# Patient Record
Sex: FEMALE | Race: WHITE
[De-identification: names, ages, dates, MRNs, and addresses within clinical notes are randomized per-mention and may not be internally consistent; named-entity substitution may affect disease eponyms.]

---

## 2018-01-04 ENCOUNTER — HOSPITAL ENCOUNTER (INPATIENT)
Dept: HOSPITAL 92 - ONC | Age: 23
LOS: 15 days | Discharge: HOME | DRG: 386 | End: 2018-01-19
Attending: INTERNAL MEDICINE | Admitting: INTERNAL MEDICINE
Payer: COMMERCIAL

## 2018-01-04 VITALS — BODY MASS INDEX: 25 KG/M2

## 2018-01-04 DIAGNOSIS — K90.9: ICD-10-CM

## 2018-01-04 DIAGNOSIS — K51.511: ICD-10-CM

## 2018-01-04 DIAGNOSIS — K12.1: ICD-10-CM

## 2018-01-04 DIAGNOSIS — F41.9: ICD-10-CM

## 2018-01-04 DIAGNOSIS — K50.111: Primary | ICD-10-CM

## 2018-01-04 DIAGNOSIS — J45.909: ICD-10-CM

## 2018-01-04 DIAGNOSIS — Z83.2: ICD-10-CM

## 2018-01-04 DIAGNOSIS — Z90.49: ICD-10-CM

## 2018-01-04 DIAGNOSIS — D63.8: ICD-10-CM

## 2018-01-04 DIAGNOSIS — D69.6: ICD-10-CM

## 2018-01-04 DIAGNOSIS — D50.0: ICD-10-CM

## 2018-01-04 LAB
ALBUMIN SERPL BCG-MCNC: 4.3 G/DL (ref 3.5–5)
ALP SERPL-CCNC: 77 U/L (ref 40–150)
ALT SERPL W P-5'-P-CCNC: 11 U/L (ref 8–55)
ANION GAP SERPL CALC-SCNC: 13 MMOL/L (ref 10–20)
AST SERPL-CCNC: 14 U/L (ref 5–34)
BACTERIA UR QL AUTO: (no result) HPF
BASOPHILS # BLD AUTO: 0.1 THOU/UL (ref 0–0.2)
BASOPHILS NFR BLD AUTO: 0.7 % (ref 0–1)
BILIRUB SERPL-MCNC: 0.5 MG/DL (ref 0.2–1.2)
BUN SERPL-MCNC: 7 MG/DL (ref 7–18.7)
CALCIUM SERPL-MCNC: 9.9 MG/DL (ref 7.8–10.44)
CHLORIDE SERPL-SCNC: 104 MMOL/L (ref 98–107)
CO2 SERPL-SCNC: 25 MMOL/L (ref 22–29)
CREAT CL PREDICTED SERPL C-G-VRATE: 115 ML/MIN (ref 70–130)
CRP SERPL-MCNC: 2.51 MG/DL
CRYSTAL-AUWI FLAG: 0.1 (ref 0–15)
EOSINOPHIL # BLD AUTO: 0.3 THOU/UL (ref 0–0.7)
EOSINOPHIL NFR BLD AUTO: 3.3 % (ref 0–10)
GLOBULIN SER CALC-MCNC: 4.5 G/DL (ref 2.4–3.5)
GLUCOSE SERPL-MCNC: 95 MG/DL (ref 70–105)
HEV IGM SER QL: 1.4 (ref 0–7.99)
HGB BLD-MCNC: 13.4 G/DL (ref 12–16)
HYALINE CASTS #/AREA URNS LPF: (no result) LPF
LYMPHOCYTES # BLD: 2.4 THOU/UL (ref 1.2–3.4)
LYMPHOCYTES NFR BLD AUTO: 23.1 % (ref 21–51)
MCH RBC QN AUTO: 31.9 PG (ref 27–31)
MCV RBC AUTO: 95.7 FL (ref 81–99)
MONOCYTES # BLD AUTO: 1.3 THOU/UL (ref 0.11–0.59)
MONOCYTES NFR BLD AUTO: 12.7 % (ref 0–10)
NEUTROPHILS # BLD AUTO: 6.2 THOU/UL (ref 1.4–6.5)
NEUTROPHILS NFR BLD AUTO: 60.1 % (ref 42–75)
PATHC CAST-AUWI FLAG: 0.13 (ref 0–2.49)
PLATELET # BLD AUTO: 591 THOU/UL (ref 130–400)
POTASSIUM SERPL-SCNC: 4 MMOL/L (ref 3.5–5.1)
RBC # BLD AUTO: 4.19 MILL/UL (ref 4.2–5.4)
RBC UR QL AUTO: (no result) HPF (ref 0–3)
SODIUM SERPL-SCNC: 138 MMOL/L (ref 136–145)
SP GR UR STRIP: 1.02 (ref 1–1.04)
SPERM-AUWI FLAG: 0 (ref 0–9.9)
WBC # BLD AUTO: 10.4 THOU/UL (ref 4.8–10.8)
WBC UR QL AUTO: (no result) HPF (ref 0–3)
YEAST-AUWI FLAG: 0 (ref 0–25)

## 2018-01-04 PROCEDURE — 36415 COLL VENOUS BLD VENIPUNCTURE: CPT

## 2018-01-04 PROCEDURE — 87046 STOOL CULTR AEROBIC BACT EA: CPT

## 2018-01-04 PROCEDURE — 80048 BASIC METABOLIC PNL TOTAL CA: CPT

## 2018-01-04 PROCEDURE — 87899 AGENT NOS ASSAY W/OPTIC: CPT

## 2018-01-04 PROCEDURE — 83735 ASSAY OF MAGNESIUM: CPT

## 2018-01-04 PROCEDURE — 85027 COMPLETE CBC AUTOMATED: CPT

## 2018-01-04 PROCEDURE — 87329 GIARDIA AG IA: CPT

## 2018-01-04 PROCEDURE — 85007 BL SMEAR W/DIFF WBC COUNT: CPT

## 2018-01-04 PROCEDURE — 87804 INFLUENZA ASSAY W/OPTIC: CPT

## 2018-01-04 PROCEDURE — 87324 CLOSTRIDIUM AG IA: CPT

## 2018-01-04 PROCEDURE — 88305 TISSUE EXAM BY PATHOLOGIST: CPT

## 2018-01-04 PROCEDURE — 85652 RBC SED RATE AUTOMATED: CPT

## 2018-01-04 PROCEDURE — 86140 C-REACTIVE PROTEIN: CPT

## 2018-01-04 PROCEDURE — 87449 NOS EACH ORGANISM AG IA: CPT

## 2018-01-04 PROCEDURE — 84100 ASSAY OF PHOSPHORUS: CPT

## 2018-01-04 PROCEDURE — 81001 URINALYSIS AUTO W/SCOPE: CPT

## 2018-01-04 PROCEDURE — 85025 COMPLETE CBC W/AUTO DIFF WBC: CPT

## 2018-01-04 PROCEDURE — 86480 TB TEST CELL IMMUN MEASURE: CPT

## 2018-01-04 PROCEDURE — 87045 FECES CULTURE AEROBIC BACT: CPT

## 2018-01-04 PROCEDURE — A4216 STERILE WATER/SALINE, 10 ML: HCPCS

## 2018-01-04 PROCEDURE — 87328 CRYPTOSPORIDIUM AG IA: CPT

## 2018-01-04 PROCEDURE — 80053 COMPREHEN METABOLIC PANEL: CPT

## 2018-01-04 NOTE — HP
PRIMARY CARE PHYSICIAN:  Dr. Isaiah Woods at [facility].

 

CHIEF COMPLAINT:  Bloody diarrhea.

 

HISTORY OF PRESENT ILLNESS:  Ms. Abernathy is a pleasant 22-year-old lady who was seen at [facility] on 01/04/2018, after she was sent to the hospital for direct admission by her gas
troenterologist.

 

She reports that she has been having diffuse abdominal discomfort as well as bloody diarrhea over sev
eral days.  She reports having multiple bloody bowel movements today.  She also reports feeling light
headed once today.  She denies any chest pain or shortness of breath.  She denies any fevers or chill
s.  She has not eaten most of today.

 

She reports that she was on Remicade for the last 4 years.  She was sent to the hospital for further 
management while awaiting approval for Humira.

 

REVIEW OF SYSTEMS:  The following complete review of systems was negative, unless otherwise mentioned
 in the HPI or below:

Constitutional:  Weight loss or gain, ability to conduct usual activities.

Skin:  Rash, itching.

Eyes:  Double vision, pain.

ENT/Mouth:  Nose bleeding, neck stiffness, pain, tenderness.

Cardiovascular:  Palpitations, dyspnea on exertion, orthopnea.

Respiratory:  Shortness of breath, wheezing, cough, hemoptysis, fever, or night sweats.

Gastrointestinal:  Poor appetite, abdominal pain, heartburn, nausea, vomiting, constipation, or diarr
hea.

Genitourinary:  Urgency, frequency, dysuria, nocturia.

Musculoskeletal:  Pain, swelling.

Neurologic/Psychiatric:  Anxiety, depression.

Allergy/Immunologic:  Skin rash, bleeding tendency.

 

PAST MEDICAL HISTORY:  Significant for ulcerative colitis and Crohn's disease.

 

PAST SURGICAL HISTORY:  Significant for right hemicolectomy.

 

ALLERGIES:  CODEINE.

 

CURRENT MEDICATIONS:  Six-mercaptopurine 50 mg daily.

 

SOCIAL HISTORY:  Patient denies tobacco use, alcohol use, or recreational drug use.

 

FAMILY HISTORY:  Significant for Crohn's disease and ulcerative colitis in her mother and grandfather
.

 

PHYSICAL EXAMINATION:

GENERAL:  Ms. Abernathy is awake and alert, not in acute distress.

VITAL SIGNS:  She is afebrile.  Blood pressure is 111/70, pulse is 86.  She is breathing at rate of 1
4 and saturating 100% on room air.

EYES:  No scleral icterus, no conjunctival pallor.

ENT:  Moist mucosal membranes, no oropharyngeal erythema or exudates.

NECK:  Supple, nontender, normal range of movement, trachea is midline.

RESPIRATORY:  Accessory muscles of breathing are not active.  Chest wall movements are symmetric bila
terally.

LUNGS:  Clear to auscultation without wheeze, rhonchi, or crepitations.

CARDIOVASCULAR:  S1 and S2 are heard, regular.  Peripheral pulses are palpable.  No carotid bruit, no
 pericardial rub.

ABDOMEN:  Soft, nontender, bowel sounds heard, no hepatomegaly, no splenomegaly.

EXTREMITIES:  She has a scar over the abdomen.

NEUROLOGIC:  Cranial nerves II-XII are intact, deep tendon reflexes are 2+.

MUSCULOSKELETAL:  Power is 5/5 in all 4 extremities.  Normal range of movement at all major extremity
 joints.

LYMPHATIC:  No cervical lymphadenopathy.

SKIN:  No rashes or subcutaneous nodules.

PSYCHIATRIC:  Normal mood, normal affect, patient is oriented to person, place, and time.  

 

LABORATORY DATA:  Ms. Richard labs and investigations were reviewed.  She has a normal white count, 
normal hemoglobin, elevated platelet count of 591,000, last known platelet count was 435,000 on 12/18
/2017, normal  electrolytes, normal creatinine, elevated C-reactive protein of 2.51, normal albumin a
nd urinalysis that is positive for ketones and small amount of leukocyte esterase.

 

ASSESSMENT AND PLAN:  Ms. Cancino is a pleasant 22-year-old lady who was seen at [facility] on 01/04/2018.  Her problem list includes:

1.  Bloody diarrhea:  Due to flareup of Crohn's and ulcerative colitis.

2.  Ulcerative colitis/Crohn's disease:  Patient has been started on intravenous steroids by Gastroen
terology Service.  She does not wish to take tonight's dose because of the possibility that it will k
eep her up during the night.  She will start steroids in the morning.  She is also awaiting approval 
for Humira.

3.  Thrombocythemia:  Chronic, likely reactive.  We will recheck platelet count.

 

Many thanks for allowing me to participate in your patient's care.  Please feel free to contact me wi
th any questions or concerns.

 

LEVEL OF RISK:  Moderate.

 

LEVEL OF COMPLEXITY:  Moderate.

## 2018-01-05 LAB
ANION GAP SERPL CALC-SCNC: 12 MMOL/L (ref 10–20)
BASOPHILS # BLD AUTO: 0 THOU/UL (ref 0–0.2)
BASOPHILS NFR BLD AUTO: 0.4 % (ref 0–1)
BUN SERPL-MCNC: 4 MG/DL (ref 7–18.7)
CALCIUM SERPL-MCNC: 8.6 MG/DL (ref 7.8–10.44)
CHLORIDE SERPL-SCNC: 108 MMOL/L (ref 98–107)
CO2 SERPL-SCNC: 21 MMOL/L (ref 22–29)
CREAT CL PREDICTED SERPL C-G-VRATE: 126 ML/MIN (ref 70–130)
EOSINOPHIL # BLD AUTO: 0.4 THOU/UL (ref 0–0.7)
EOSINOPHIL NFR BLD AUTO: 3.3 % (ref 0–10)
GLUCOSE SERPL-MCNC: 97 MG/DL (ref 70–105)
HGB BLD-MCNC: 11.2 G/DL (ref 12–16)
LYMPHOCYTES # BLD: 2.2 THOU/UL (ref 1.2–3.4)
LYMPHOCYTES NFR BLD AUTO: 20.1 % (ref 21–51)
MCH RBC QN AUTO: 32.2 PG (ref 27–31)
MCV RBC AUTO: 95.7 FL (ref 81–99)
MONOCYTES # BLD AUTO: 1.4 THOU/UL (ref 0.11–0.59)
MONOCYTES NFR BLD AUTO: 12.6 % (ref 0–10)
NEUTROPHILS # BLD AUTO: 7 THOU/UL (ref 1.4–6.5)
NEUTROPHILS NFR BLD AUTO: 63.6 % (ref 42–75)
PLATELET # BLD AUTO: 465 THOU/UL (ref 130–400)
POTASSIUM SERPL-SCNC: 3.8 MMOL/L (ref 3.5–5.1)
RBC # BLD AUTO: 3.47 MILL/UL (ref 4.2–5.4)
SODIUM SERPL-SCNC: 137 MMOL/L (ref 136–145)
WBC # BLD AUTO: 11 THOU/UL (ref 4.8–10.8)

## 2018-01-05 RX ADMIN — ALUMINUM ZIRCONIUM TRICHLOROHYDREX GLY SCH EACH: 0.2 STICK TOPICAL at 21:18

## 2018-01-05 NOTE — PRG
DATE OF SERVICE:  01/05/2018

 

SUBJECTIVE:  The patient is feeling better today.  She reports the ulcers in her mouth have improved.
  She has not had many bowel movements today, but she reports she is not eating as well.  She says law carcamo is scared to eat because of the abdominal discomfort that this will cause.

 

OBJECTIVE:

VITAL SIGNS:  Temperature 98.0, pulse of 70, respiratory rate 14, blood pressure 106/63.

CHEST:  Clear.

CARDIOVASCULAR:  Regular rate and rhythm.

ABDOMEN:  Benign.

 

LABORATORY DATA:  Shows a white blood cell count of 11.0, hemoglobin 11.2, hematocrit 33.2.  Chemistr
ies shows CO2 of 21.

 

ASSESSMENT:

1.  Indeterminate colitis flare.

2.  History of right colon resection.

 

RECOMMENDATIONS:

1.  Flexible sigmoidoscopy in a.m.

2.  Continue 6-MP.

3.  Continue IV steroids.

4.  Begin Humira when available.

## 2018-01-05 NOTE — PDOC.PN
- Subjective


Encounter Start Date: 01/05/18


Encounter Start Time: 07:00


-: old records requested/rev





pt has hematochesia and diarrhoea, no fever


Patient seen and examined. No overnight events





- Objective


MAR Reviewed: Yes


Vital Signs & Weight: 


 Vital Signs (12 hours)











  Temp Pulse Resp BP Pulse Ox


 


 01/05/18 08:50  99.4 F  104 H  18   99


 


 01/05/18 06:55  99.4 F  104 H  18  111/69  99








 Weight











Weight                         128 lb 8 oz














I&O: 


 











 01/04/18 01/05/18 01/06/18





 06:59 06:59 06:59


 


Intake Total   1900


 


Balance   1900











Result Diagrams: 


 01/05/18 04:10





 01/05/18 04:10





Phys Exam





- Physical Examination


Constitutional: NAD


HEENT: PERRLA, moist MMs, sclera anicteric


Neck: no JVD, supple


Respiratory: no wheezing, no rales, no rhonchi


Cardiovascular: RRR, no significant murmur, no rub


Gastrointestinal: soft, non-tender, no distention, positive bowel sounds


Musculoskeletal: no edema, pulses present


Neurological: non-focal, normal sensation


Lymphatic: no nodes


Psychiatric: normal affect, A&O x 3


Skin: no rash, normal turgor





Dx/Plan


(1) Diarrhea


Code(s): R19.7 - DIARRHEA, UNSPECIFIED   Status: Acute   





(2) Exacerbation of ulcerative colitis


Code(s): K51.90 - ULCERATIVE COLITIS, UNSPECIFIED, WITHOUT COMPLICATIONS   

Status: Acute   





(3) Hematochezia


Code(s): K92.1 - MELENA   Status: Acute   





(4) Anemia in chronic illness


Code(s): D63.8 - ANEMIA IN OTHER CHRONIC DISEASES CLASSIFIED ELSEWHERE   Status

: Chronic   





- Plan


cont current plan of care





* continue ivf


* continue solumedrol


* GI following


* medication reviewed as below


* symptomatic treatment.








Review of Systems





- Review of Systems


Constitutional: negative: fever, chills, sweats, weakness, malaise, other


Eyes: negative: Pain, Vision Change, Conjunctivae Inflammation, Eyelid 

Inflammation, Redness, Other


ENT: negative: Ear Pain, Ear Discharge, Nose Pain, Nose Discharge, Nose 

Congestion, Mouth Pain, Mouth Swelling, Throat Pain, Throat Swelling, Other


Respiratory: negative: Cough, Dry, Shortness of Breath, Hemoptysis, SOB with 

Excertion, Pleuritic Pain, Sputum, Wheezing


Cardiovascular: negative: chest pain, palpitations, orthopnea, paroxysmal 

nocturnal dyspnea, edema, light headedness, other


Gastrointestinal: Diarrhea, Hematochezia.  negative: Nausea, Vomiting, 

Abdominal Pain, Constipation, Melena, Other


Genitourinary: negative: Dysuria, Frequency, Incontinence, Hematuria, Retention

, Other


Musculoskeletal: negative: Neck Pain, Shoulder Pain, Arm Pain, Back Pain, Hand 

Pain, Leg Pain, Foot Pain, Other


Skin: negative: Rash, Lesions, Robert, Bruising, Other


Neurological: negative: Weakness, Numbness, Incoordination, Change in Speech, 

Confusion, Seizures, Other





- Medications/Allergies


Allergies/Adverse Reactions: 


 Allergies











Allergy/AdvReac Type Severity Reaction Status Date / Time


 


codeine Allergy   Verified 01/04/18 22:33











Medications: 


 Current Medications





Acetaminophen (Tylenol)  650 mg PO Q4H PRN


   PRN Reason: Headache/Fever or Pain


Sodium Chloride (Normal Saline 0.9%)  1,000 mls @ 125 mls/hr IV .Q8H [facility]


   Last Admin: 01/05/18 02:30 Dose:  1,000 mls


Mercaptopurine (Purinethol)  50 mg PO DAILY [facility]


   Last Admin: 01/05/18 08:26 Dose:  Not Given


Methylprednisolone Sodium Succinate (Solu-Medrol)  20 mg IVP 0600,1200,1800 [facility]


   Last Admin: 01/05/18 06:08 Dose:  20 mg


Tramadol HCl (Ultram)  50 mg PO Q8H PRN


   PRN Reason: Moderate Pain (4-6)


   Last Admin: 01/05/18 06:12 Dose:  50 mg


Zolpidem Tartrate (Ambien)  5 mg PO HSPRN PRN


   PRN Reason: Insomnia

## 2018-01-05 NOTE — CON
DATE OF CONSULTATION:  01/04/2018

 

HISTORY OF PRESENT ILLNESS:  The patient is a 22-year-old  female, patient of Dr. Ramsey haley
h a long history of Crohn's disease.  She reports over the last several weeks, she has had progressiv
ely worsening diarrhea.  She reports the diarrhea is mostly blood with little bit of mucus and very l
ittle stool.  She has 1-2 tablespoons every 20-30 minutes, particularly worsened if she is up and wal
luis e around.  She reports diffuse abdominal discomfort, but not significant pain.  She reports she ha
s had no vomiting, but she does feel nausea, she has not had much p.o. intake.  She has lost 10 pound
s over the last several weeks.  She has been on Remicade and 6-MP, but this has recently been stopped
 and she is due to start Humira this Monday.  She does not like oral steroids because they interfere 
with her sleep.

 

PAST MEDICAL HISTORY:  Anxiety, asthma, indeterminate colitis.

 

PAST SURGICAL HISTORY:  Includes right hemicolectomy secondary to perforation.

 

ALLERGIES:  CODEINE.

 

MEDICATIONS:  At this present time include "enema" 100 mg per 60 mL 1 enema every night for 14 days, 
Remicade 600 mg at 10 mg/kg every 8 weeks, 6-mercaptopurine 50 mg 1 p.o. daily, Restasis eyedrops.

 

SOCIAL HISTORY:  She drinks rarely.  Does not smoke.

 

FAMILY HISTORY:  Significant for autoimmune disease.

 

REVIEW OF SYSTEMS:  Ten systems were reviewed and negative for above.

 

PHYSICAL EXAMINATION:

VITAL SIGNS:  Shows temperature 99.5, pulse 82, respiratory rate 18, blood pressure 105/79.

HEENT:  Unremarkable.

NECK:  Supple.

CHEST:  Clear.

CARDIOVASCULAR:  Regular rate and rhythm.

ABDOMEN:  Soft, nontender, without organomegaly or masses.

RECTAL:  Deferred.

EXTREMITIES:  Normal.

NEUROLOGIC:  Nonfocal.

 

LABORATORY DATA:  Shows normal white count of 10.4, hemoglobin 13.4, hematocrit of 40.2, platelet cou
nt 591.  Chemistries significant for C-reactive protein 2.51.  Total protein 88, globulin 45.  Last p
rocedure was performed by the Dr. Mustafa on 02/09/2017 showed ileal to be normal and anastomosis was 
normal.  Descending and transverse colon were normal.  Some scarred sigmoid colon was normal other th
an scarring and eroded vascular plaque was in the rectum, indicating very mild proctitis.  Prior to t
hat in 09/15/2017, colonoscopy showed fairly normal appearing colon.  Laboratory showed CMV DNA Ultra
quant was negative.  Also on December chemistries were normal.  Lipase was 16.  CRP was 0.87.  Sed ra
te was 10.  CBC was essentially normal.  Histoplasma antigen was less than 0.5.  Stool was presence o
f elevated fecal lactoferrin.  C. diff was negative.  Prior biopsies from February showed chronic mod
erate active colitis and rectal biopsies and chronic mild active colitis and sigmoid biopsies.  Prome
hari labs showed detectable serum infliximab and detectable antibodies to infliximab.

 

ASSESSMENT:

1.  A 22-year-old female with history of indeterminate colitis, now with worsening abdominal discomfo
rt and bloody diarrhea.  The diarrhea sounds like if it is almost anal outlet bleeding or possibly a 
proctitis related bleeding.  She did not have a large amount of associated diarrhea.  Therefore, I do
 not think that she has a colitis affecting more proximal portions of the colon.

2.  Right colon resection secondary to perforation.

 

RECOMMENDATIONS:

1.  We will continue 6-MP, we will start some oral steroids after tonight.

2.  Rehydrate.

3.  I think we will probably need to do a colonoscopy on this patient to further elucidate the extent
 of her active disease.

4.  Agree with switching from Remicade to Humira, we will see if we were able to obtain Humira in the
 hospital or bringing in her Humira for loading.

## 2018-01-06 PROCEDURE — 0DBG8ZX EXCISION OF LEFT LARGE INTESTINE, VIA NATURAL OR ARTIFICIAL OPENING ENDOSCOPIC, DIAGNOSTIC: ICD-10-PCS

## 2018-01-06 RX ADMIN — ALUMINUM ZIRCONIUM TRICHLOROHYDREX GLY SCH EACH: 0.2 STICK TOPICAL at 21:03

## 2018-01-06 NOTE — OP
PREOPERATIVE DIAGNOSIS:  Indeterminate colitis exacerbation.

 

DESCRIPTION OF PROCEDURE:  After informed consent was obtained, the patient was placed in the left la
teral decubitus position.  Anesthesia was administered per the Anesthesia Department.  Forward-viewin
g endoscope was inserted into the rectum after perianal inspection and rectal exam were normal.  It w
as passed to the midtransverse colon where the prep limited visualization.  That area of the colon ap
peared to be endoscopically normal.  Beginning at approximately 65 cm from the anal verge, there was 
a circumferential colitis that involved internal left colon to the anus.  Biopsies were taken randoml
y from that area.  There was granularity mucus and friability with bleeding consistent with ulcerativ
e colitis.

 

ASSESSMENT:

1.  Diffuse colitis from the anus to 65 cm endoscopically consistent with the UC - status post biopsy
.

2.  Otherwise normal unprepped flexible sigmoidoscopy.

 

RECOMMENDATIONS:

1.  Continue 6-MP.

2.  Continue IV steroids.

3.  Begin Humira when available.

## 2018-01-06 NOTE — PDOC.PN
- Subjective


Encounter Start Date: 01/06/18


Encounter Start Time: 13:23





Patient seen and examined, scope for today, will await results, no new symptoms 

or complaints. Family at bedside, all questions answered.





- Objective


Vital Signs & Weight: 


 Vital Signs (12 hours)











  Temp Pulse Resp BP Pulse Ox


 


 01/06/18 11:30  98.0 F  70  14  121/64  100


 


 01/06/18 10:00  98.4 F  75  14  


 


 01/06/18 07:05  98.4 F  75  14  109/67  100








 Weight











Admit Weight                   128 lb


 


Weight                         128 lb














I&O: 


 











 01/05/18 01/06/18 01/07/18





 06:59 06:59 06:59


 


Intake Total  3520 


 


Balance  3520 











Result Diagrams: 


 01/05/18 04:10





 01/05/18 04:10





Phys Exam





- Physical Examination


Constitutional: NAD


HEENT: PERRLA, moist MMs, sclera anicteric


Neck: no nodes, no JVD


Respiratory: no wheezing, no rales, no rhonchi


Cardiovascular: RRR, no significant murmur


Gastrointestinal: soft, no distention


mildly tender to palpation


Musculoskeletal: no edema, pulses present


Neurological: non-focal, normal sensation


Lymphatic: no nodes


Psychiatric: normal affect, A&O x 3


Skin: no rash





Dx/Plan


(1) Diarrhea


Code(s): R19.7 - DIARRHEA, UNSPECIFIED   Status: Acute   





(2) Exacerbation of ulcerative colitis


Code(s): K51.90 - ULCERATIVE COLITIS, UNSPECIFIED, WITHOUT COMPLICATIONS   

Status: Acute   





(3) Hematochezia


Code(s): K92.1 - MELENA   Status: Acute   





(4) Anemia in chronic illness


Code(s): D63.8 - ANEMIA IN OTHER CHRONIC DISEASES CLASSIFIED ELSEWHERE   Status

: Chronic   





- Plan





* scope for today


* pain control


* cont w/ IV steroids and current medical management


* case and plan d/w patient and family at length, they understand and agree 

with this plan

## 2018-01-07 RX ADMIN — ALUMINUM ZIRCONIUM TRICHLOROHYDREX GLY SCH EACH: 0.2 STICK TOPICAL at 22:50

## 2018-01-07 NOTE — PRG
DATE OF SERVICE:  01/07/2018

 

SUBJECTIVE:  The patient continues to have blood, abdominal cramps, poor p.o. intake.  She has not ga
ined much benefit from the steroids.

 

OBJECTIVE:

VITAL SIGNS:  Temperature 97.5, pulse 56, respiratory rate 16, and blood pressure 112/71.

CHEST:  Clear.

CARDIOVASCULAR:  Regular rate and rhythm.

ABDOMEN:  Soft, slightly tender in the lower quadrants.

 

LABORATORY DATA:  Shows no new labs.

 

ASSESSMENT:  Crohn exacerbation - the patient is not getting much benefit from steroids.

 

RECOMMENDATIONS:

1.  We will switch to _____ as soon as it arrives, this is supposed to be on Monday.

2.  Continue IV steroids.

3.  Continue 6-MP.

4.  Repeat labs.

## 2018-01-08 LAB
ALBUMIN SERPL BCG-MCNC: 3.3 G/DL (ref 3.5–5)
ALP SERPL-CCNC: 53 U/L (ref 40–150)
ALT SERPL W P-5'-P-CCNC: 8 U/L (ref 8–55)
ANION GAP SERPL CALC-SCNC: 12 MMOL/L (ref 10–20)
AST SERPL-CCNC: 9 U/L (ref 5–34)
BILIRUB SERPL-MCNC: 0.4 MG/DL (ref 0.2–1.2)
BUN SERPL-MCNC: 9 MG/DL (ref 7–18.7)
CALCIUM SERPL-MCNC: 8.7 MG/DL (ref 7.8–10.44)
CHLORIDE SERPL-SCNC: 103 MMOL/L (ref 98–107)
CO2 SERPL-SCNC: 25 MMOL/L (ref 22–29)
CREAT CL PREDICTED SERPL C-G-VRATE: 136 ML/MIN (ref 70–130)
GLOBULIN SER CALC-MCNC: 3.4 G/DL (ref 2.4–3.5)
GLUCOSE SERPL-MCNC: 90 MG/DL (ref 70–105)
HGB BLD-MCNC: 11.7 G/DL (ref 12–16)
MCH RBC QN AUTO: 32.7 PG (ref 27–31)
MCV RBC AUTO: 96.4 FL (ref 81–99)
MDIFF COMPLETE?: YES
PLATELET # BLD AUTO: 506 THOU/UL (ref 130–400)
PLATELET BLD QL SMEAR: (no result)
POTASSIUM SERPL-SCNC: 4.1 MMOL/L (ref 3.5–5.1)
RBC # BLD AUTO: 3.57 MILL/UL (ref 4.2–5.4)
SODIUM SERPL-SCNC: 136 MMOL/L (ref 136–145)
WBC # BLD AUTO: 9.9 THOU/UL (ref 4.8–10.8)

## 2018-01-08 RX ADMIN — ALUMINUM ZIRCONIUM TRICHLOROHYDREX GLY SCH EACH: 0.2 STICK TOPICAL at 22:50

## 2018-01-08 NOTE — PDOC.PN
- Subjective


Encounter Start Date: 01/08/18


Encounter Start Time: 07:00





Patient seen and examined. No new complaints. No overnight events





- Objective


MAR Reviewed: Yes


Vital Signs & Weight: 


 Vital Signs (12 hours)











  Temp Pulse Resp BP Pulse Ox


 


 01/08/18 08:00  98.1 F  58 L  14  


 


 01/08/18 07:05  98.1 F  58 L  14  119/78  99








 Weight











Admit Weight                   128 lb


 


Weight                         128 lb














I&O: 


 











 01/07/18 01/08/18 01/09/18





 06:59 06:59 06:59


 


Intake Total 1310 3930.5 


 


Balance 1310 3930.5 











Result Diagrams: 


 01/08/18 04:51





 01/08/18 04:51





Phys Exam





- Physical Examination


Constitutional: NAD


HEENT: PERRLA, moist MMs, sclera anicteric


Neck: no JVD, supple


Respiratory: no wheezing, no rales, no rhonchi


Cardiovascular: RRR, no significant murmur, no rub


Gastrointestinal: soft, non-tender, no distention, positive bowel sounds


Musculoskeletal: no edema, pulses present


Neurological: non-focal, normal sensation, moves all 4 limbs


Lymphatic: no nodes


Psychiatric: normal affect, A&O x 3


Skin: no rash, normal turgor





Dx/Plan


(1) Diarrhea


Code(s): R19.7 - DIARRHEA, UNSPECIFIED   Status: Acute   





(2) Exacerbation of ulcerative colitis


Code(s): K51.90 - ULCERATIVE COLITIS, UNSPECIFIED, WITHOUT COMPLICATIONS   

Status: Acute   





(3) Hematochezia


Code(s): K92.1 - MELENA   Status: Acute   





(4) Anemia in chronic illness


Code(s): D63.8 - ANEMIA IN OTHER CHRONIC DISEASES CLASSIFIED ELSEWHERE   Status

: Chronic   





- Plan


cont current plan of care, plan discussed w/ family





* medication reviewed as below


* symptomatic treatment


* await Humira approval


* GI following.








Review of Systems





- Review of Systems


Constitutional: negative: fever, chills, sweats, weakness, malaise, other


Eyes: negative: Pain, Vision Change, Conjunctivae Inflammation, Eyelid 

Inflammation, Redness, Other


ENT: negative: Ear Pain, Ear Discharge, Nose Pain, Nose Discharge, Nose 

Congestion, Mouth Pain, Mouth Swelling, Throat Pain, Throat Swelling, Other


Respiratory: negative: Cough, Dry, Shortness of Breath, Hemoptysis, SOB with 

Excertion, Pleuritic Pain, Sputum, Wheezing


Cardiovascular: negative: chest pain, palpitations, orthopnea, paroxysmal 

nocturnal dyspnea, edema, light headedness, other


Gastrointestinal: Diarrhea, Hematochezia.  negative: Nausea, Vomiting, 

Abdominal Pain, Constipation, Melena, Other


Genitourinary: negative: Dysuria, Frequency, Incontinence, Hematuria, Retention

, Other


Musculoskeletal: negative: Neck Pain, Shoulder Pain, Arm Pain, Back Pain, Hand 

Pain, Leg Pain, Foot Pain, Other


Skin: negative: Rash, Lesions, Robert, Bruising, Other


Neurological: negative: Weakness, Numbness, Incoordination, Change in Speech, 

Confusion, Seizures, Other





- Medications/Allergies


Allergies/Adverse Reactions: 


 Allergies











Allergy/AdvReac Type Severity Reaction Status Date / Time


 


codeine Allergy   Verified 01/04/18 22:33











Medications: 


 Current Medications





Acetaminophen (Tylenol)  650 mg PO Q4H PRN


   PRN Reason: Headache/Fever or Pain


Sodium Chloride (Normal Saline 0.9%)  1,000 mls @ 125 mls/hr IV .Q8H [facility]


   Last Admin: 01/08/18 09:45 Dose:  1,000 mls


Methylprednisolone Sodium Succinate (Solu-Medrol)  20 mg IVP 0600,1200,1800 [facility]


   Last Admin: 01/08/18 11:25 Dose:  20 mg


Mercaptopurine 50 Mg  0 each PO HS [facility]


   Last Admin: 01/07/18 22:50 Dose:  1 each


Sodium Chloride (Flush - Normal Saline)  10 ml IVF PRN PRN


   PRN Reason: Saline Flush


Tramadol HCl (Ultram)  50 mg PO Q8H PRN


   PRN Reason: Moderate Pain (4-6)


   Last Admin: 01/06/18 21:03 Dose:  50 mg


Tramadol HCl (Ultram)  50 mg PO Q6H PRN


   PRN Reason: Pain 4-6


   Last Admin: 01/08/18 11:25 Dose:  50 mg


Zolpidem Tartrate (Ambien)  5 mg PO HSPRN PRN


   PRN Reason: Insomnia

## 2018-01-09 RX ADMIN — ALUMINUM ZIRCONIUM TRICHLOROHYDREX GLY SCH EACH: 0.2 STICK TOPICAL at 23:06

## 2018-01-09 NOTE — PDOC.PN
- Subjective


Encounter Start Date: 01/09/18


Encounter Start Time: 06:00





Patient seen and examined. No new complaints. No overnight events


clinically appears better but per pt she is not eating enough





- Objective


MAR Reviewed: Yes


Vital Signs & Weight: 


 Vital Signs (12 hours)











  Temp Pulse Resp BP Pulse Ox


 


 01/09/18 08:05  98.1 F  64  16   100


 


 01/09/18 07:15  98.1 F  64  16  94/58 L  100


 


 01/09/18 05:32      98








 Weight











Admit Weight                   128 lb


 


Weight                         128 lb














I&O: 


 











 01/08/18 01/09/18 01/10/18





 06:59 06:59 06:59


 


Intake Total 3930.5 3470 


 


Balance 3930.5 3470 











Result Diagrams: 


 01/08/18 04:51





 01/08/18 04:51





Phys Exam





- Physical Examination


Constitutional: NAD


HEENT: PERRLA, moist MMs, sclera anicteric


Neck: no JVD, supple


Respiratory: no wheezing, no rales, no rhonchi


Cardiovascular: RRR, no significant murmur, no rub


Gastrointestinal: soft, non-tender, no distention, positive bowel sounds


Musculoskeletal: no edema, pulses present


Neurological: non-focal, normal sensation, moves all 4 limbs


Psychiatric: normal affect, A&O x 3


Skin: no rash, normal turgor





Dx/Plan


(1) Diarrhea


Code(s): R19.7 - DIARRHEA, UNSPECIFIED   Status: Acute   





(2) Exacerbation of ulcerative colitis


Code(s): K51.90 - ULCERATIVE COLITIS, UNSPECIFIED, WITHOUT COMPLICATIONS   

Status: Acute   





(3) Hematochezia


Code(s): K92.1 - MELENA   Status: Acute   





(4) Anemia in chronic illness


Code(s): D63.8 - ANEMIA IN OTHER CHRONIC DISEASES CLASSIFIED ELSEWHERE   Status

: Chronic   





- Plan


cont current plan of care





* humira given


* on IVF


* on IV steroid


* will defer discharge decision to GI


* medication reviewed as below


* symptomatic treatment.








Review of Systems





- Review of Systems


ENT: negative: Ear Pain, Ear Discharge, Nose Pain, Nose Discharge, Nose 

Congestion, Mouth Pain, Mouth Swelling, Throat Pain, Throat Swelling, Other


Respiratory: negative: Cough, Dry, Shortness of Breath, Hemoptysis, SOB with 

Excertion, Pleuritic Pain, Sputum, Wheezing


Cardiovascular: negative: chest pain, palpitations, orthopnea, paroxysmal 

nocturnal dyspnea, edema, light headedness, other


Gastrointestinal: Diarrhea.  negative: Nausea, Vomiting, Abdominal Pain, 

Constipation, Melena, Hematochezia, Other


Genitourinary: negative: Dysuria, Frequency, Incontinence, Hematuria, Retention

, Other


Musculoskeletal: negative: Neck Pain, Shoulder Pain, Arm Pain, Back Pain, Hand 

Pain, Leg Pain, Foot Pain, Other


Skin: negative: Rash, Lesions, Robert, Bruising, Other





- Medications/Allergies


Allergies/Adverse Reactions: 


 Allergies











Allergy/AdvReac Type Severity Reaction Status Date / Time


 


codeine Allergy   Verified 01/04/18 22:33











Medications: 


 Current Medications





Acetaminophen (Tylenol)  650 mg PO Q4H PRN


   PRN Reason: Headache/Fever or Pain


Sodium Chloride (Normal Saline 0.9%)  1,000 mls @ 125 mls/hr IV .Q8H [facility]


   Last Admin: 01/09/18 01:27 Dose:  1,000 mls


Methylprednisolone Sodium Succinate (Solu-Medrol)  20 mg IVP 0600,1200,1800 [facility]


   Last Admin: 01/09/18 05:07 Dose:  20 mg


Mercaptopurine 50 Mg  0 each PO HS [facility]


   Last Admin: 01/08/18 22:50 Dose:  1 each


Sodium Chloride (Flush - Normal Saline)  10 ml IVF PRN PRN


   PRN Reason: Saline Flush


Tramadol HCl (Ultram)  50 mg PO Q8H PRN


   PRN Reason: Moderate Pain (4-6)


   Last Admin: 01/06/18 21:03 Dose:  50 mg


Tramadol HCl (Ultram)  50 mg PO Q6H PRN


   PRN Reason: Pain 4-6


   Last Admin: 01/09/18 05:09 Dose:  50 mg


Zolpidem Tartrate (Ambien)  5 mg PO HSPRN PRN


   PRN Reason: Insomnia

## 2018-01-09 NOTE — PRG
DATE OF SERVICE:  01/09/2018

 

Ms. Abernathy is still having diarrhea, mainly just small amounts of mucoid stool and blood 6-10 times a
 day with some tenesmus.  She is not eating much.

 

MEDICATIONS:

1.  Tylenol p.r.n.

2.  6-MP 50 mg daily.  

3.  Methylprednisolone 20 mg IV q.8h.

4.  Tramadol p.r.n.

 

PHYSICAL EXAMINATION:

VITAL SIGNS:  Temperature is 98, pulse 64, blood pressure 168/94 to 58.

ABDOMEN:  Soft and nontender.  There is no rebound or guarding.

EXTREMITIES:  No clubbing, cyanosis or edema.

 

LABORATORY STUDIES:  White count is 9.9, this was yesterday, hemoglobin was 11.7, platelet count was 
506.  Yesterday, patient did receive Humira 40 mg injections x4 for initial loading dose.  Stool has 
been negative.  Biopsies negative for C. diff and culture.  Biopsies are pending.

 

ASSESSMENT:  Ulcerative colitis with previous surgery.  She had a good response to Remicade for many 
years and 6-MP, but it has still not been working for her.  She did have an infusion of Remicade a co
uple weeks ago which she did not respond to.  She tried some Entocort with no improvement and the rec
belem suppositories really did not help.

 

PLAN:

1.  Continue induction with Humira.

2.  We will increase prednisone to 40 q.8h.

3.  We will await biopsies.

4.  Place her on low residue diet.

5.  She can start getting out of bed and walking in the halls.

6.  PlexiPulses so she does not get DVTs.

## 2018-01-10 RX ADMIN — Medication PRN ML: at 05:25

## 2018-01-10 RX ADMIN — ALUMINUM ZIRCONIUM TRICHLOROHYDREX GLY SCH EACH: 0.2 STICK TOPICAL at 20:42

## 2018-01-10 NOTE — PRG
DATE OF SERVICE:  01/10/2018

 

SUBJECTIVE:  Ms. Abernathy states that today she actually ate and did not have diarrhea right away.  She
 is still having some cramping pain and the nurses notes, she likes to use Ultram for that.  She did 
decide to go ahead and take this increased dose of Solu-Medrol 40 IV q.6 hours as ordered yesterday.

 

PRESENT MEDICATIONS:  Tylenol p.r.n., Bentyl 10 before meals and bedtime, mercaptopurine 50 mg, Solu-
Medrol 40 IV q.8 hours, Florastor, tramadol, zolpidem, normal saline at 125 an hour.

 

OBJECTIVE:

VITAL SIGNS:  Temperature 96, pulse 60, respirations 16, blood pressure 112/72.

LUNGS:  Clear.

HEART:  Regular rate and rhythm without clicks or murmurs.

ABDOMEN:  Soft and is certainly nontender.  No rebound or guarding.

 

LABORATORY DATA:  No labs today or the last 2 days.  The pathology is back.  The biopsy showed severe
 active ulcerative colitis.  Recent labs negative for CMV DNA and histoplasmosis.  I do not see a rec
ent QuantiFERON here at this hospital, but I think she is had in the outpatient setting.

 

ASSESSMENT AND PLAN:  Severe ulcerative colitis, descending colon, status post induction with Humira.
  She was on Remicade previously, but developed antibodies, no longer having affect.  She is on stero
ids 40 mg IV q.8 hours of Solu-Medrol and we placed her on some Bentyl.  She is up and walking, doing
 yoga in the room _____. She is on Lovenox subcu now.  She is getting PlexiPulses at night.  We hope,
 we will start seeing some improvement in the next 24-48 hours and maybe get home in the next 24-48 h
ours.  We will repeat labs tomorrow.

## 2018-01-11 LAB
ANION GAP SERPL CALC-SCNC: 13 MMOL/L (ref 10–20)
BUN SERPL-MCNC: 9 MG/DL (ref 7–18.7)
CALCIUM SERPL-MCNC: 9 MG/DL (ref 7.8–10.44)
CHLORIDE SERPL-SCNC: 107 MMOL/L (ref 98–107)
CO2 SERPL-SCNC: 22 MMOL/L (ref 22–29)
CREAT CL PREDICTED SERPL C-G-VRATE: 151 ML/MIN (ref 70–130)
GLUCOSE SERPL-MCNC: 108 MG/DL (ref 70–105)
HGB BLD-MCNC: 12.3 G/DL (ref 12–16)
MCH RBC QN AUTO: 31.7 PG (ref 27–31)
MCV RBC AUTO: 95.2 FL (ref 81–99)
MDIFF COMPLETE?: YES
PLATELET # BLD AUTO: 469 THOU/UL (ref 130–400)
PLATELET BLD QL SMEAR: (no result)
POTASSIUM SERPL-SCNC: 3.9 MMOL/L (ref 3.5–5.1)
RBC # BLD AUTO: 3.87 MILL/UL (ref 4.2–5.4)
SODIUM SERPL-SCNC: 138 MMOL/L (ref 136–145)
WBC # BLD AUTO: 13.1 THOU/UL (ref 4.8–10.8)

## 2018-01-11 RX ADMIN — Medication PRN ML: at 05:22

## 2018-01-11 RX ADMIN — ALUMINUM ZIRCONIUM TRICHLOROHYDREX GLY SCH EACH: 0.2 STICK TOPICAL at 21:14

## 2018-01-11 RX ADMIN — Medication PRN ML: at 21:12

## 2018-01-11 NOTE — PRG
DATE OF SERVICE:  01/11/2018

 

SUBJECTIVE:  Ms. Abernathy feels about the same.  She ate a little better today, but she had 2 bowel mov
ements today that was bloody.

 

PHYSICAL EXAMINATION:

VITAL SIGNS:  Temperature is 98, pulse 58, blood pressure 117/68.

ABDOMEN:  Soft, nontender.

LUNGS:  Clear.

HEART:  Regular rate and rhythm without clicks or murmurs.

ABDOMEN:  Soft, nontender.

 

LABORATORY STUDIES:  White count 13, hemoglobin was 12, platelet count 469.  Basic metabolic profile 
normal.

 

ASSESSMENT:  The patient with active colitis.  Stool negative for Clostridium difficile on admission.
  Biopsies consistent with colitis, on Solu-Medrol 40 IV q.8 h., received Humira 4 days ago, waiting 
for that to take effect.  Symptomatic care with antispasmodics.  We will continue to follow.

## 2018-01-11 NOTE — PDOC.PN
- Subjective


Encounter Start Date: 01/10/18


Encounter Start Time: 08:20


Subjective: seen and examined able to eat a little bit today





- Objective


Vital Signs & Weight: 


 Vital Signs (12 hours)











  Temp Pulse Resp BP Pulse Ox


 


 01/11/18 07:05  97.9 F  45 L  14  87/52 L  98








 Weight











Admit Weight                   128 lb


 


Weight                         128 lb














I&O: 


 











 01/10/18 01/11/18 01/12/18





 06:59 06:59 06:59


 


Intake Total 3651 3661 


 


Balance 3651 3661 











Result Diagrams: 


 01/11/18 05:15





 01/11/18 05:15





Phys Exam





- Physical Examination


Constitutional: NAD


HEENT: PERRLA, moist MMs, sclera anicteric, TM's clear


Neck: no nodes, no JVD, supple, full ROM


Respiratory: no wheezing, no rales, no rhonchi, clear to auscultation bilateral


Cardiovascular: RRR, no significant murmur, no rub


Gastrointestinal: soft, positive bowel sounds


Musculoskeletal: no edema, pulses present





Dx/Plan


(1) Diarrhea


Code(s): R19.7 - DIARRHEA, UNSPECIFIED   Status: Acute   





(2) Exacerbation of ulcerative colitis


Code(s): K51.90 - ULCERATIVE COLITIS, UNSPECIFIED, WITHOUT COMPLICATIONS   

Status: Acute   





(3) Hematochezia


Code(s): K92.1 - MELENA   Status: Acute   





(4) Anemia in chronic illness


Code(s): D63.8 - ANEMIA IN OTHER CHRONIC DISEASES CLASSIFIED ELSEWHERE   Status

: Chronic   





- Plan


plan discussed w/ family, 


Pain management


-: GI following--will use Bentyl





* .

## 2018-01-11 NOTE — PDOC.PN
- Subjective


Encounter Start Date: 01/11/18


Encounter Start Time: 08:25


Subjective: Seen and examined still having a lot of Gi issues





- Objective


Vital Signs & Weight: 


 Vital Signs (12 hours)











  Temp Pulse Resp BP Pulse Ox


 


 01/11/18 07:05  97.9 F  45 L  14  87/52 L  98








 Weight











Admit Weight                   128 lb


 


Weight                         128 lb














I&O: 


 











 01/10/18 01/11/18 01/12/18





 06:59 06:59 06:59


 


Intake Total 3651 3661 


 


Balance 3651 3661 











Result Diagrams: 


 01/11/18 05:15





 01/11/18 05:15





Phys Exam





- Physical Examination


Constitutional: NAD


HEENT: PERRLA, moist MMs, sclera anicteric, TM's clear


Neck: no nodes, no JVD, supple, full ROM


Respiratory: no wheezing, no rales, no rhonchi, clear to auscultation bilateral


Cardiovascular: RRR, no significant murmur, no rub


Gastrointestinal: soft, no distention, positive bowel sounds


tender


Musculoskeletal: no edema, pulses present





Dx/Plan


(1) Diarrhea


Code(s): R19.7 - DIARRHEA, UNSPECIFIED   Status: Acute   





(2) Exacerbation of ulcerative colitis


Code(s): K51.90 - ULCERATIVE COLITIS, UNSPECIFIED, WITHOUT COMPLICATIONS   

Status: Acute   





(3) Hematochezia


Code(s): K92.1 - MELENA   Status: Acute   





(4) Anemia in chronic illness


Code(s): D63.8 - ANEMIA IN OTHER CHRONIC DISEASES CLASSIFIED ELSEWHERE   Status

: Chronic   





- Plan


plan discussed w/ family, 


On steroids and IVF


-: GI still actively involved -appreciate their input





* .

## 2018-01-12 RX ADMIN — ALUMINUM ZIRCONIUM TRICHLOROHYDREX GLY SCH EACH: 0.2 STICK TOPICAL at 19:37

## 2018-01-12 NOTE — PRG
DATE OF SERVICE:  01/12/2018

 

SUBJECTIVE:  Ms. Abernathy has not taking the dicyclomine, she does not like it.  She prefers to take tr
amadol, which she tries not to, to see how she is feeling.  She had two bowel movements this morning,
 earlier that were a little bit bloody similar to what she has been having and only had one this afte
rnoon and it was more formed and actual stool with no blood.  The nurses note she did not really up a
round very much, which she tries to be, but she had a quite a few visitors today.  On talking with [facility] patient, she is taking her 6-MP from home.

 

HOME MEDICATIONS:  List on her medications, she also continues on Solu-Medrol 40 IV q.8 and Florastor
 once a day.  She received 4 loading shots of Humira on Monday.

 

The patient is currently in the shower.  Physical examination was not performed.

 

LABORATORY DATA:  On reviewing labs, CMV was negative on 12/18/2017.  Histoplasmosis negative on 12/1
8/2017.  C. diff was negative on 10/2017 as well as 01/04/2018.

 

ASSESSMENT:  Colitis, initially it was indeterminate colitis, felt to be ulcerative colitis.  She had
 a perforation in the cecum spontaneously when she initially presented back in 2013.  Subsequent [facility]
owup endoscopies showed quite a bit of deep or serpiginous ulcerations in the sigmoid colon and rectu
m, and it was felt that she probably have Crohn's and [facility] serologies along with a diagnosis of
 Crohn's.  She has done well from 2013 until now with Remicade, but developed recurrent symptoms.  Ha
d negative Infectious Disease workup and then had labs showing antibodies to Remicade in low levels a
nd that was a trough.  She had already had a dose escalation a year before to 10 mg/kg and the decisi
on was made to stop the medication and switched to Humira.  In the interim, she became ill.  She wish
 to trying some steroids at home, but does not want a dose escalate and ultimately was admitted to [facility] hospital for worsening symptoms.  Here she has had a negative C. diff.  She received the Humira skip
ts 5 days ago, was on Solu-Medrol 20, which we increased 2 days ago to 40 q.8 h. secondary to persist
ent symptoms today.  She may be starting to turn the corner with only 3 bowel movements today.  Her v
ital signs are stable.

 

PLAN:  We will double the probiotic to twice a day.  We will recheck stool for C. diff.  We will repe
at all labs tomorrow including magnesium and phosphorus, sed rate and CRP.  If she does not start to 
turn the corner soon, the next option would be to consider adding antibiotics for possible Crohn's an
d make her n.p.o. and placing her on TPN and repeating endoscopy.  These issues discussed with the rosa ordaz and Dr. Mendenhall my partner will be covering over the weekend to follow along.  I have added Del
zicol 4.8 g a day.

## 2018-01-13 LAB
ALBUMIN SERPL BCG-MCNC: 3.2 G/DL (ref 3.5–5)
ALP SERPL-CCNC: 52 U/L (ref 40–150)
ALT SERPL W P-5'-P-CCNC: 7 U/L (ref 8–55)
ANION GAP SERPL CALC-SCNC: 11 MMOL/L (ref 10–20)
AST SERPL-CCNC: 7 U/L (ref 5–34)
BASOPHILS # BLD AUTO: 0.1 THOU/UL (ref 0–0.2)
BASOPHILS NFR BLD AUTO: 0.6 % (ref 0–1)
BILIRUB SERPL-MCNC: 0.4 MG/DL (ref 0.2–1.2)
BUN SERPL-MCNC: 6 MG/DL (ref 7–18.7)
CALCIUM SERPL-MCNC: 9.1 MG/DL (ref 7.8–10.44)
CHLORIDE SERPL-SCNC: 103 MMOL/L (ref 98–107)
CO2 SERPL-SCNC: 26 MMOL/L (ref 22–29)
CREAT CL PREDICTED SERPL C-G-VRATE: 127 ML/MIN (ref 70–130)
EOSINOPHIL # BLD AUTO: 0.1 THOU/UL (ref 0–0.7)
EOSINOPHIL NFR BLD AUTO: 0.6 % (ref 0–10)
GLOBULIN SER CALC-MCNC: 3 G/DL (ref 2.4–3.5)
GLUCOSE SERPL-MCNC: 124 MG/DL (ref 70–105)
HGB BLD-MCNC: 12.4 G/DL (ref 12–16)
LYMPHOCYTES # BLD: 2.6 THOU/UL (ref 1.2–3.4)
LYMPHOCYTES NFR BLD AUTO: 19.3 % (ref 21–51)
MAGNESIUM SERPL-MCNC: 1.9 MG/DL (ref 1.6–2.6)
MCH RBC QN AUTO: 32.9 PG (ref 27–31)
MCV RBC AUTO: 97.1 FL (ref 81–99)
MONOCYTES # BLD AUTO: 2 THOU/UL (ref 0.11–0.59)
MONOCYTES NFR BLD AUTO: 14.5 % (ref 0–10)
NEUTROPHILS # BLD AUTO: 8.9 THOU/UL (ref 1.4–6.5)
NEUTROPHILS NFR BLD AUTO: 65.1 % (ref 42–75)
PLATELET # BLD AUTO: 466 THOU/UL (ref 130–400)
POTASSIUM SERPL-SCNC: 4.1 MMOL/L (ref 3.5–5.1)
RBC # BLD AUTO: 3.78 MILL/UL (ref 4.2–5.4)
SODIUM SERPL-SCNC: 136 MMOL/L (ref 136–145)
WBC # BLD AUTO: 13.7 THOU/UL (ref 4.8–10.8)

## 2018-01-13 RX ADMIN — ALUMINUM ZIRCONIUM TRICHLOROHYDREX GLY SCH EACH: 0.2 STICK TOPICAL at 20:07

## 2018-01-13 NOTE — PRG
DATE OF SERVICE:  01/13/2018

 

SUBJECTIVE:  Overnight, the patient did well with decreasing abdominal pain this morning, had no acut
e events or problems overnight.  She has had approximately 4-8 semi-solid to liquid bowel movements o
shlomo the last 24 hours, all of which have been bloody.  No associated abdominal pain with these bloody
 stools.  She was placed on Delzicol within the last 24 hours and is tolerating the medication well. 
 Currently, denies any nausea, vomiting, fevers, chills or shortness of breath.

 

OBJECTIVE:

VITAL SIGNS:  Temperature 98.5, pulse 53, blood pressure 99/58, respiratory rate 16 and satting 100% 
on room air.

GENERAL:  The patient is lying comfortably in bed in no acute distress.  Alert and oriented x4.

CARDIOVASCULAR:  Regular rate and rhythm with no discernible murmurs, gallops or rubs.

RESPIRATORY:  Clear to auscultation bilaterally with no wheezes or rales.

ABDOMEN:  Normoactive bowel sounds, soft.  Mild tenderness to palpation in the lower abdominal quadra
nts.  Nondistended.

EXTREMITIES:  No cyanosis, clubbing or edema.

 

LABORATORY DATA:  CBC with a white blood cell count of 13.7, hemoglobin 12.4, hematocrit 36.7 and justina
telets 466.  Chemistry with a sodium of 136, potassium 4.1, chloride 103, CO2 of 26, BUN 6 and creati
nine 0.63.

 

ASSESSMENT AND PLAN:  The patient is a 22-year-old female with past medical history of colitis, most 
consistent with Crohn's colitis presenting with acute flare.

 

Crohn's colitis.  Patient presenting with a history of colitis that was initially felt to be indeterm
inate colitis when she presented back in 2013 with a cecal perforation.  However, further serologies 
and colonoscopy with biopsies yield a diagnosis more of Crohn disease rather than ulcerative colitis.
  She was subsequently placed on Remicade and mercaptopurine and had done well with remission of dise
ase until recently when she had more recurrent symptoms.  Infectious Disease workup was negative with
 labs showing antibodies to Remicade at low level in the past, but the antibodies have escalated desp
ite risk escalation of Remicade to 10 mg per kilograms.  With the presence of antibodies to Remicade,
 this medication is no longer effective and was subsequently transferred to [facility] with induction dos
ing this Monday with approximately 160 mg at that time.  Her next dose of Humira would be in 2 weeks 
from Monday at approximately 80 mg dosing.  Currently feeling better, but continues to have bloody khai
wel movements consistent with Crohn's colitis exacerbation.

 

RECOMMENDATIONS:

1.  Continue Florastor probiotic twice daily.

2.  Continue Delzicol and mercaptopurine for maintenance therapy for Crohn disease.

3.  Her next dosing of Humira will be in approximately 1-1/2 weeks with 80 mg delivered at that time.


4.  Advance diet as tolerated.

## 2018-01-14 RX ADMIN — ALUMINUM ZIRCONIUM TRICHLOROHYDREX GLY SCH EACH: 0.2 STICK TOPICAL at 21:33

## 2018-01-14 NOTE — PDOC.PN
- Subjective


Encounter Start Date: 01/13/18


Encounter Start Time: 05:12


Subjective: seen and exzmined still with bloogy diarhoea





- Objective


Vital Signs & Weight: 


 Vital Signs (12 hours)











  Temp Pulse Resp BP


 


 01/13/18 20:00  98.0 F  55 L  16 


 


 01/13/18 19:36  98.0 F  55 L  16  106/57 L








 Weight











Admit Weight                   128 lb


 


Weight                         128 lb














I&O: 


 











 01/12/18 01/13/18 01/14/18





 06:59 06:59 06:59


 


Intake Total 3240 3940 2270


 


Balance 3240 3940 2270











Result Diagrams: 


 01/13/18 05:17





 01/13/18 05:17





Phys Exam





- Physical Examination


Constitutional: NAD


HEENT: PERRLA, moist MMs, sclera anicteric, oral pharynx no lesions


Neck: no nodes, no JVD, supple, full ROM


Respiratory: no wheezing, no rales, clear to auscultation bilateral


Cardiovascular: RRR, no significant murmur, no rub


Gastrointestinal: positive bowel sounds


Musculoskeletal: no edema





Dx/Plan


(1) Diarrhea


Code(s): R19.7 - DIARRHEA, UNSPECIFIED   Status: Acute   





(2) Exacerbation of ulcerative colitis


Code(s): K51.90 - ULCERATIVE COLITIS, UNSPECIFIED, WITHOUT COMPLICATIONS   

Status: Acute   





(3) Hematochezia


Code(s): K92.1 - MELENA   Status: Acute   





(4) Anemia in chronic illness


Code(s): D63.8 - ANEMIA IN OTHER CHRONIC DISEASES CLASSIFIED ELSEWHERE   Status

: Chronic   





- Plan


plan discussed w/ family, PT/OT, 


appreciate GI input


-: decrease ivf rate and gradually advance diet





* .

## 2018-01-14 NOTE — PRG
DATE OF SERVICE:  01/14/2018

 

SUBJECTIVE:  Overnight, the patient had increased abdominal pain and was 
nauseated that continued on to this morning.  She attributed this to eating an 
increased diet of meats and cake as she was trying the food for her upcoming 
wedding.  She was able to get some sleep this afternoon and upon waking, she 
said she feels much better.  She continues to have approximately 5-6 bloody 
bowel movements per day and had attempted using the hyoscyamine overnight with 
little to no relief in her abdominal pain.  Currently, denies any nausea, 
vomiting, fevers, chills, or shortness of breath.

 

OBJECTIVE:

VITAL SIGNS:  Temperature 97.8, pulse 53, blood pressure 114/81, respiratory 
rate 12, satting 98% on room air.

GENERAL:  Lying in bed comfortably in no acute distress.  Alert and oriented x4.

CARDIOVASCULAR:  Regular rate and rhythm with no discernible murmurs, gallops, 
or rubs.

RESPIRATORY:  Clear to auscultation bilaterally with no wheezes or rales.

ABDOMEN:  Normoactive bowel sounds, soft, nondistended, mild tenderness to 
palpation in the lower abdominal quadrants.

 

LABORATORY DATA:  ESR 14, CRP 0.5.

 

ASSESSMENT AND PLAN:  The patient is a 22-year-old female with past medical 
history of colitis, most consistent with Crohn's colitis presenting with an 
acute flare.

 

Crohn's colitis

The patient is presenting with a history of colitis that was initially felt to 
be indeterminate colitis when she presented back in 2013 with a cecal 
perforation.  However, the further serologies and colonoscopy with biopsies 
yield a diagnosis more consistent with Crohn's disease.  She was subsequently 
placed on Remicade, mercaptopurine and had done well with remission of her 
disease until recently with more recurrent symptoms.  In the past, Infectious 
Disease workup was negative with labs showing antibodies to Remicade at a low 
level, but the antibodies had escalated to the point where the infusion of 
infliximab was ineffective.  She was subsequently transferred to [facility] with 
induction dosing this last Monday with the administration of approximately 160 
mg at that time.  Currently, with some problems with increased abdominal pain 
overnight that has since resolved, however, she continues to have bloody bowel 
movements consistent with Crohn's colitis exacerbation.

 

RECOMMENDATIONS:

1.  Continue Florastor probiotic twice daily.

2.  Would continue Delzicol and mercaptopurine at the current dose for 
maintenance therapy of Crohn's disease.

3.  Would continue Humira with induction dosing and administration of 80 mg in 
approximately 1 week.

4.  Can continue with hyoscyamine as needed, although would have a low 
threshold to discontinue given ineffective nature in the past.

5.  Advance diet as tolerated.

 

MTDD

## 2018-01-14 NOTE — PDOC.PN
- Subjective


Encounter Start Date: 01/14/18


Encounter Start Time: 09:29


Subjective: Seen and examined --had a very rough night





- Objective


Vital Signs & Weight: 


 Vital Signs (12 hours)











  Temp Pulse Resp BP Pulse Ox


 


 01/14/18 08:00  98.0 F  51 L  14  115/71  100








 Weight











Admit Weight                   128 lb


 


Weight                         128 lb














I&O: 


 











 01/13/18 01/14/18 01/15/18





 06:59 06:59 06:59


 


Intake Total 3940 2270 


 


Balance 3940 2270 











Result Diagrams: 


 01/13/18 05:17





 01/13/18 05:17





Phys Exam





- Physical Examination


Constitutional: NAD


HEENT: PERRLA, moist MMs, sclera anicteric, TM's clear


Neck: no nodes, no JVD, supple, full ROM


Respiratory: no wheezing, no rales, no rhonchi


Cardiovascular: RRR, no significant murmur, no rub


Gastrointestinal: positive bowel sounds





Dx/Plan


(1) Diarrhea


Code(s): R19.7 - DIARRHEA, UNSPECIFIED   Status: Acute   





(2) Exacerbation of ulcerative colitis


Code(s): K51.90 - ULCERATIVE COLITIS, UNSPECIFIED, WITHOUT COMPLICATIONS   

Status: Acute   





(3) Hematochezia


Code(s): K92.1 - MELENA   Status: Acute   





(4) Anemia in chronic illness


Code(s): D63.8 - ANEMIA IN OTHER CHRONIC DISEASES CLASSIFIED ELSEWHERE   Status

: Chronic   





- Plan


plan discussed w/ family, continue antibiotics, PT/OT, 


-Hopefully GI will adjust meds 


-: -Heating pad for comfort





* .

## 2018-01-15 RX ADMIN — ALUMINUM ZIRCONIUM TRICHLOROHYDREX GLY SCH EACH: 0.2 STICK TOPICAL at 20:50

## 2018-01-15 NOTE — PDOC.PN
- Subjective


Encounter Start Date: 01/15/18


Encounter Start Time: 17:26





Patient seen and examined. No new complaints. No overnight events





- Objective


MAR Reviewed: Yes


Vital Signs & Weight: 


 Vital Signs (12 hours)











  Temp Pulse Resp BP Pulse Ox


 


 01/15/18 09:00  97.8 F  50 L  16  99/71  100


 


 01/15/18 08:00  97.8 F  53 L  12  








 Weight











Admit Weight                   128 lb


 


Weight                         128 lb














I&O: 


 











 01/14/18 01/15/18 01/16/18





 06:59 06:59 06:59


 


Intake Total 2270 2170 


 


Balance 2270 2170 











Result Diagrams: 


 01/13/18 05:17





 01/13/18 05:17





Phys Exam





- Physical Examination


Constitutional: NAD


HEENT: PERRLA


Neck: no JVD


Respiratory: no wheezing


Cardiovascular: no significant murmur


mild tenderness


Musculoskeletal: pulses present


Neurological: moves all 4 limbs


Psychiatric: A&O x 3





Dx/Plan


(1) Diarrhea


Code(s): R19.7 - DIARRHEA, UNSPECIFIED   Status: Acute   





(2) Exacerbation of ulcerative colitis


Code(s): K51.90 - ULCERATIVE COLITIS, UNSPECIFIED, WITHOUT COMPLICATIONS   

Status: Acute   





(3) Anemia in chronic illness


Code(s): D63.8 - ANEMIA IN OTHER CHRONIC DISEASES CLASSIFIED ELSEWHERE   Status

: Chronic   





- Plan





* f/u gi plan


* continue current rx

## 2018-01-15 NOTE — PRG
DATE OF SERVICE:  01/15/2018

 

SUBJECTIVE:  Ms. Abernathy states that she had a bad night couple nights ago and was given Levsin, but t
his made her feel like she could not go to the bathroom and could not void, so it was stopped.  Appar
ently, that was started by the hospitalist, though orders were under my name, but I was not here over
 the weekend and Dr. Steinberg did not start it.  She states that today she still had 7-8 bowel movements
 and some cramping, but saw less blood.

 

PHYSICAL EXAMINATION:

VITAL SIGNS:  Temperature is 97.8, pulse 50, blood pressure is 99/71.

HEENT:  Oropharynx without rash or lesions.

LUNGS:  Clear.

HEART:  Regular rate and rhythm.

ABDOMEN:  Soft, nontender.

 

LABORATORY STUDIES:  Blood work, none today.  C-reactive protein had dropped from 2.51 to less than 0
.5 from 01/04/2018 to 01/13/2018.  Clostridium difficile was negative when checked over the weekend. 
 Sed rate was 14, it had been 60s and 80s back in 2013 when she was diagnosed.

 

ASSESSMENT:  Left-sided colitis, severe to 60 cm.  This is hospital day 11.  Maybe we are starting to
 see some decreased bleeding.  C-reactive protein has improved.  She has been on Solu-Medrol 40 q.8 h
ours. for 5 days, for the first 6 days she was on 20 q.8 hours.  Delzicol was added over the weekend.
  She feels this makes her cramp and had more bloating.  She does not like the Levsin or Bentyl so sh
e has not been taking it.  She takes the Ultram typically for discomfort.

 

PLAN:  We will stop the Delzicol to see if this makes her feel better.  We will re-evaluate tomorrow.
  If we are not seeing some significant improvement, we will plan for repeat endoscopy.

## 2018-01-16 LAB
BASOPHILS # BLD AUTO: 0 THOU/UL (ref 0–0.2)
BASOPHILS NFR BLD AUTO: 0.2 % (ref 0–1)
EOSINOPHIL # BLD AUTO: 0.1 THOU/UL (ref 0–0.7)
EOSINOPHIL NFR BLD AUTO: 0.3 % (ref 0–10)
HGB BLD-MCNC: 12.1 G/DL (ref 12–16)
LYMPHOCYTES # BLD: 1.8 THOU/UL (ref 1.2–3.4)
LYMPHOCYTES NFR BLD AUTO: 10.8 % (ref 21–51)
MCH RBC QN AUTO: 31.3 PG (ref 27–31)
MCV RBC AUTO: 97.5 FL (ref 81–99)
MONOCYTES # BLD AUTO: 1.6 THOU/UL (ref 0.11–0.59)
MONOCYTES NFR BLD AUTO: 9.7 % (ref 0–10)
NEUTROPHILS # BLD AUTO: 13 THOU/UL (ref 1.4–6.5)
NEUTROPHILS NFR BLD AUTO: 78.9 % (ref 42–75)
PLATELET # BLD AUTO: 447 THOU/UL (ref 130–400)
RBC # BLD AUTO: 3.85 MILL/UL (ref 4.2–5.4)
WBC # BLD AUTO: 16.5 THOU/UL (ref 4.8–10.8)

## 2018-01-16 RX ADMIN — ALUMINUM ZIRCONIUM TRICHLOROHYDREX GLY SCH EACH: 0.2 STICK TOPICAL at 21:49

## 2018-01-16 NOTE — PRG
DATE OF SERVICE:  01/16/2018

 

SUBJECTIVE:  Ms. Woods is feeling better.  Pain with eating today.  Bowel movements a little bit le
ss bloody today.

 

OBJECTIVE:

VITAL SIGNS:  Temperature 98, pulse 58, respirations 16 and blood pressure 109/72.

ABDOMEN:  Soft and nontender.

LUNGS:  Clear.

HEART:  Regular rate and rhythm without clicks or murmurs.

 

LABORATORY STUDIES:  White count 16.5, hemoglobin 12 and platelet count 447 and decreased.

 

ASSESSMENT:  Crohn disease, status post transition from [facility] to [facility].  She had her first dose a
bout 11 days ago; next dose will be next Monday in 7 days.

 

PLAN:  We will try just her back to oral steroids and see if we can get out of the hospital tomorrow.

## 2018-01-16 NOTE — PDOC.PN
- Subjective


Encounter Start Date: 01/16/18


Encounter Start Time: 16:04





Patient seen and examined. No new complaints. No overnight events





- Objective


MAR Reviewed: Yes


Vital Signs & Weight: 


 Vital Signs (12 hours)











  Temp Pulse Resp BP Pulse Ox


 


 01/16/18 08:00  98.0 F  58 L  16  


 


 01/16/18 07:05  98.0 F  58 L  16  109/72  100








 Weight











Admit Weight                   128 lb


 


Weight                         128 lb














I&O: 


 











 01/15/18 01/16/18 01/17/18





 06:59 06:59 06:59


 


Intake Total 2170 2172 


 


Balance 2170 2172 











Result Diagrams: 


 01/16/18 07:34





 01/13/18 05:17





Phys Exam





- Physical Examination


Constitutional: NAD


HEENT: PERRLA


Neck: no JVD


Respiratory: no wheezing


Cardiovascular: no significant murmur


Gastrointestinal: soft


Musculoskeletal: pulses present


Neurological: moves all 4 limbs


Psychiatric: A&O x 3





Dx/Plan


(1) Diarrhea


Code(s): R19.7 - DIARRHEA, UNSPECIFIED   Status: Acute   





(2) Anemia in chronic illness


Code(s): D63.8 - ANEMIA IN OTHER CHRONIC DISEASES CLASSIFIED ELSEWHERE   Status

: Chronic   





(3) Acute Crohn's disease


Code(s): K50.90 - CROHN'S DISEASE, UNSPECIFIED, WITHOUT COMPLICATIONS   Status: 

Acute   





- Plan





* trial of steroids


* f/u gi plan

## 2018-01-17 RX ADMIN — ALUMINUM ZIRCONIUM TRICHLOROHYDREX GLY SCH EACH: 0.2 STICK TOPICAL at 20:08

## 2018-01-17 NOTE — PDOC.PN
- Subjective


Encounter Start Date: 01/17/18


Encounter Start Time: 07:50


-: old records requested/rev





Pt seen and examined, chart reviewed in its entirety.  Elisa giron my first visit 

with this patient.  Admitted for acute crohn's flare.  Started on po steroids 

today, pt seen by Dr Mustafa earlier.  Plan to continue current management, and 

if improved discharg ein AM, no further changes today





No F/C, no N/V/d/C, pain ok, no acute events.





10 point ROS performed and neg for all systems except as per HPI





- Objective


MAR Reviewed: Yes


Vital Signs & Weight: 


 Vital Signs (12 hours)











  Temp Pulse Resp BP Pulse Ox


 


 01/17/18 08:00  98.4 F  72  14  


 


 01/17/18 07:15  98.4 F  72  14  105/69  99








 Weight











Admit Weight                   128 lb


 


Weight                         128 lb














I&O: 


 











 01/16/18 01/17/18 01/18/18





 06:59 06:59 06:59


 


Intake Total 2172 1440 


 


Balance 2172 1440 











Result Diagrams: 


 01/16/18 07:34





 01/13/18 05:17


Radiology Reviewed by me: Yes


EKG Reviewed by me: Yes





Phys Exam





- Physical Examination


Constitutional: NAD


HEENT: PERRLA, moist MMs, sclera anicteric, oral pharynx no lesions


Neck: no nodes, no JVD, supple, full ROM


Respiratory: no wheezing, no rales, no rhonchi, clear to auscultation bilateral


Cardiovascular: RRR, no significant murmur, no rub


Gastrointestinal: soft, non-tender, no distention, positive bowel sounds


Musculoskeletal: no edema, pulses present


Neurological: non-focal, normal sensation, moves all 4 limbs


Lymphatic: no nodes


Psychiatric: normal affect, A&O x 3


Skin: no rash, normal turgor, cap refill <2 seconds





Dx/Plan


(1) Acute Crohn's disease


Code(s): K50.90 - CROHN'S DISEASE, UNSPECIFIED, WITHOUT COMPLICATIONS   Status: 

Acute   


Qualifiers: 


   Digestive disease complication type: other complication   Qualified Code(s): 

K50.918 - Crohn's disease, unspecified, with other complication   





(2) Diarrhea


Code(s): R19.7 - DIARRHEA, UNSPECIFIED   Status: Chronic   


Qualifiers: 


   Diarrhea type: due to malabsorption   Qualified Code(s): K90.9 - Intestinal 

malabsorption, unspecified; R19.7 - Diarrhea, unspecified; R19.7 - Diarrhea, 

unspecified   





(3) Exacerbation of ulcerative colitis


Code(s): K51.90 - ULCERATIVE COLITIS, UNSPECIFIED, WITHOUT COMPLICATIONS   

Status: Acute   


Qualifiers: 


   Digestive disease complication type: with rectal bleeding   Qualified Code(s)

: K51.911 - Ulcerative colitis, unspecified with rectal bleeding   





(4) Anemia in chronic illness


Code(s): D63.8 - ANEMIA IN OTHER CHRONIC DISEASES CLASSIFIED ELSEWHERE   Status

: Chronic   





- Plan


cont current plan of care, out of bed/ambulate





* .

## 2018-01-17 NOTE — PRG
DATE OF SERVICE:  01/17/2018

 

Ms. Abernathy was switched to oral prednisone today.  Her IV Solu-Medrol was stopped yesterday.  She sta
ron that she felt really weak and kind of tired after that was stopped and she had some bleeding, a l
ittle bit more this morning than she had yesterday.  She is a little bit fearful that things are wors
ening.

 

PHYSICAL EXAMINATION:  

VITAL SIGNS:  Temperature is 98.  She has been afebrile, pulse 92, blood pressure 105/69.

ABDOMEN:  Soft, nontender.

HEENT:  Oropharynx without lesions.

 

LABORATORY DATA:  No labs today.

 

ASSESSMENT:  Crohn's colitis, previous surgery, previous therapy with Remicade, which became ineffect
gabrielle as outlined in previous notes.  She was started on Humira, which she had her first shots of about
 9 days ago.  She has had a drop in sed rate, a stable hemoglobin.  We have switched her to oral pred
nisone.  We will check labs tomorrow and if she remains stable, plan on discharge tomorrow on a slow 
oral prednisone taper.  She will follow up in the office next week with repeat Remicade injections.

## 2018-01-18 LAB
BASOPHILS # BLD AUTO: 0 THOU/UL (ref 0–0.2)
BASOPHILS NFR BLD AUTO: 0.2 % (ref 0–1)
EOSINOPHIL # BLD AUTO: 0.2 THOU/UL (ref 0–0.7)
EOSINOPHIL NFR BLD AUTO: 1.6 % (ref 0–10)
HGB BLD-MCNC: 12.6 G/DL (ref 12–16)
LYMPHOCYTES # BLD: 4.3 THOU/UL (ref 1.2–3.4)
LYMPHOCYTES NFR BLD AUTO: 28 % (ref 21–51)
MCH RBC QN AUTO: 30.9 PG (ref 27–31)
MCV RBC AUTO: 96 FL (ref 81–99)
MONOCYTES # BLD AUTO: 2.2 THOU/UL (ref 0.11–0.59)
MONOCYTES NFR BLD AUTO: 14.2 % (ref 0–10)
NEUTROPHILS # BLD AUTO: 8.5 THOU/UL (ref 1.4–6.5)
NEUTROPHILS NFR BLD AUTO: 55.9 % (ref 42–75)
PLATELET # BLD AUTO: 411 THOU/UL (ref 130–400)
RBC # BLD AUTO: 4.09 MILL/UL (ref 4.2–5.4)
WBC # BLD AUTO: 15.2 THOU/UL (ref 4.8–10.8)

## 2018-01-18 RX ADMIN — ALUMINUM ZIRCONIUM TRICHLOROHYDREX GLY SCH EACH: 0.2 STICK TOPICAL at 20:07

## 2018-01-18 NOTE — PDOC.PN
- Subjective


Encounter Start Date: 01/18/18


Encounter Start Time: 08:40





Pt seen and exmained.  No prednisone this morning as pt was expected to go home

, reordered.





NO F/c, Temp current 99.5.  No N/V, + diarrhea with dark blood clots.  abd 

cramping, no tenderness, hernando crackers, chicken, goldfish snacks, fluids





Pt doesnt feel much better at this point, no CP, no SOB





10 point ROS performed and neg for all systems except as above





- Objective


Resuscitation Status: 





full





MAR Reviewed: Yes


Vital Signs & Weight: 


 Vital Signs (12 hours)











  Temp Pulse Resp BP Pulse Ox


 


 01/18/18 07:30  99.8 F H  88  16   99


 


 01/18/18 07:20  99.8 F H  88  16  105/65  99


 


 01/18/18 05:29  99.5 F    








 Weight











Admit Weight                   128 lb


 


Weight                         128 lb














I&O: 


 











 01/17/18 01/18/18 01/19/18





 06:59 06:59 06:59


 


Intake Total 1440 400 


 


Balance 1440 400 











Result Diagrams: 


 01/18/18 05:25





 01/13/18 05:17





Phys Exam





- Physical Examination


Constitutional: NAD


HEENT: PERRLA, moist MMs, sclera anicteric, oral pharynx no lesions


Neck: no nodes, no JVD, supple, full ROM


Respiratory: no wheezing, no rales, no rhonchi, clear to auscultation bilateral


Cardiovascular: RRR, no significant murmur, no rub


Gastrointestinal: soft, no distention, positive bowel sounds


mildly tender to LLQ


Musculoskeletal: no edema, pulses present


Neurological: non-focal, normal sensation, moves all 4 limbs


Lymphatic: no nodes


Psychiatric: normal affect, A&O x 3


Skin: no rash, normal turgor, cap refill <2 seconds





Dx/Plan


(1) Acute Crohn's disease


Code(s): K50.90 - CROHN'S DISEASE, UNSPECIFIED, WITHOUT COMPLICATIONS   Status: 

Acute   


Qualifiers: 


   Digestive disease complication type: other complication   Qualified Code(s): 

K50.918 - Crohn's disease, unspecified, with other complication   


Comment: humira repeat dose on 1/22/2018   





(2) Diarrhea


Code(s): R19.7 - DIARRHEA, UNSPECIFIED   Status: Chronic   


Qualifiers: 


   Diarrhea type: due to malabsorption   Qualified Code(s): K90.9 - Intestinal 

malabsorption, unspecified; R19.7 - Diarrhea, unspecified; R19.7 - Diarrhea, 

unspecified   





(3) Exacerbation of ulcerative colitis


Code(s): K51.90 - ULCERATIVE COLITIS, UNSPECIFIED, WITHOUT COMPLICATIONS   

Status: Acute   


Qualifiers: 


   Digestive disease complication type: with rectal bleeding   Qualified Code(s)

: K51.911 - Ulcerative colitis, unspecified with rectal bleeding   





(4) Anemia in chronic illness


Code(s): D63.8 - ANEMIA IN OTHER CHRONIC DISEASES CLASSIFIED ELSEWHERE   Status

: Chronic   





- Plan


cont current plan of care, out of bed/ambulate





* .


continue prednisone po, ESR and CRP negative as of 1/13.  Follow up on GI recs

## 2018-01-19 VITALS — DIASTOLIC BLOOD PRESSURE: 87 MMHG | SYSTOLIC BLOOD PRESSURE: 118 MMHG

## 2018-01-19 VITALS — TEMPERATURE: 100 F

## 2018-01-19 PROCEDURE — 0DBL8ZX EXCISION OF TRANSVERSE COLON, VIA NATURAL OR ARTIFICIAL OPENING ENDOSCOPIC, DIAGNOSTIC: ICD-10-PCS | Performed by: INTERNAL MEDICINE

## 2018-01-19 PROCEDURE — 0DBN8ZX EXCISION OF SIGMOID COLON, VIA NATURAL OR ARTIFICIAL OPENING ENDOSCOPIC, DIAGNOSTIC: ICD-10-PCS | Performed by: INTERNAL MEDICINE

## 2018-01-19 NOTE — OP
Colitis, inadequate improvement with 10 days of IV steroids, now 3 more days of oral steroids.  There
 has been improvement in C-reactive protein, hemoglobin stable, platelet count has slowly trended breanne
n; however, clinically she is not improved _____ and pain.

 

PLAN:  Sigmoidoscopy to evaluate the status of colitis.

 

POSTOPERATIVE DIAGNOSES:  Severe colitis from about 70-80 cm down to the rectum.  In the sigmoid, the
re is area with some significant ulceration or erosion, otherwise appears a pretty normal transverse 
colon with formed stool in the proximal transverse colon.  Biopsies were taken from the transverse, d
escending, sigmoid regions and submitted to pathology.  Has to rule out CMV and other possible etiolo
gies, although these have been checked previously in the outpatient setting a few weeks ago.

 

RECOMMENDATIONS:  We will continue oral steroids.  We will add topical steroid rectally.  She is due 
for next Remicade injection of induction on Friday.  At this time, I do not think there is any impend
ing risk of perforation, we will ask her if she wants to try to go home and do these things and then 
come back to the office on Monday for her shot.

## 2018-01-19 NOTE — DIS
DATE OF ADMISSION:  01/04/2018

 

DATE OF DISCHARGE:  01/19/2018

 

DISCHARGE DIAGNOSES:

1.  Acute flare of Crohn disease.

2.  Chronic Crohn disease.

3.  Hematochezia.

4.  Iron deficiency anemia due to chronic slow gastrointestinal blood loss.

5.  Abdominal pain, intractable.

6.  Chronic diarrhea.

 

CONSULTATIONS:

1.  Gastroenterology, initially seen by Dr. Srinath Heller and taken over by Dr. Washington Mustafa, on 0
1/09/2018.

2.  The patient was seen in cross cover by Dr. Steinberg, on 01/14/2018.

 

PROCEDURES PERFORMED:

1.  On 01/06/2018, lower endoscopy revealing diffuse colitis from the anus to 65 cm consistent with u
lcerative colitis, biopsies were taken.

2.  Repeat colonoscopy on 01/19/2018, with minor changes.

 

HISTORY AND PHYSICAL:  Ms. Abernathy is a 22-year-old female with known Crohn disease who presented to [facility] gastroenterologist on 01/04/2018.  She was obviously having acute Crohn flare, so was directly adm
itted to the hospital and the patient was seen and admitted by Dr. Ladd.

 

HOSPITAL COURSE:  The patient was seen and examined.  The patient was started on IV Solu-Medrol by GI
, but this dose was refused due to the fact it would probably keep her up at night and so steroids we
re started the following morning.  She was waiting for approval for Humira as she has been on Remicad
e in the past, but had failed therapy.

 

Overnight on 01/04-01/05, the patient did fairly well.  Pain was tolerable with oral pain medications
.

 

On 01/05, the patient was taken to OR by Dr. Waters.  She had hematochezia and diarrhea, but no feve
rs and belly was relatively stable.  She was started on the IV steroids that morning.

 

Gastroenterology was consulted and saw her the night of admission.  They followed along and by 01/06,
 decided to take her for endoscopy.  She was found to have diffuse colitis consistent with ulcerative
 colitis.  She was continued on IV steroids.  By 01/07, the patient was seen having blood-tinged stoo
ls, abdominal cramping, and poor p.o. intake, did not really improve on the steroids.  She was approp
riate for Humira, so this was ordered with the plans to give it to her on the first week day coming u
p.  She was continued on mercaptopurine.

 

On 01/08/2018, the patient was continued on the same management and was slowly improving, certainly n
ot getting worse.

 

On 01/09, Dr. Mustafa took over from gastroenterology standpoint and the patient was given an inductio
n dose of Humira and tolerated fine.

 

On 01/10-01/16, the patient slowly improved.  Sed rate and C-reactive protein both went from abnormal
 to normal.  Her symptoms were improving and her diarrhea improved.  She had less rectal bleeding.

 

On 01/17, I took the case over; the patient had a little more pain, GI kept overnight to continue tra
nsition to oral steroids.  She had another almost a week to get before she was due for her next dose 
of Humira.

 

On 01/18, she was still having diffuse pain and occasional blood-tinged bowel movements.  Plans were 
made to prep her and take her for a repeat endoscopy on 01/19.

 

By today, 01/19, the patient was feeling fairly stable.  Repeat endoscopy showed no new lesions, but 
did show improvement.  She was started on hydrocortisone per rectum and continued on p.o. prednisone.
  She was considered ready for discharge by GI and was sent home for outpatient followup.  The patien
t was seen and examined on the day of discharge.  Discharge plan and disposition was discussed with t
he patient and her mother face to face at the bedside.

 

DISCHARGE MEDICATIONS:

1.  Mercaptopurine 50 mg p.o. daily.

2.  Prednisone 40 mg p.o. q.a.m.

3.  Florastor 250 mg p.o. b.i.d.

4.  Hydrocortisone 100 mg per rectum at bedtime.

5.  Doxycycline 100 mg p.o. b.i.d. for acne.

 

She gets her next dose of Humira on 01/22/2018 at Dr. Mustafa' office.

 

FOLLOWUP APPOINTMENTS:

1.  Primary care physician who is Dr. Isaiah Woods at [facility] within a week.

2.  Dr. Mustafa on Monday, 01/22/2018, for Humira.

 

DISCHARGE DIET:  As tolerated.

 

DISCHARGE ACTIVITY:  As tolerated.

 

DISCHARGE CONDITION:  Good.

 

DISPOSITION:  Being discharged to home via private vehicle with family.

## 2018-01-22 NOTE — OP
(To replace the previously incompletely dictated note.)

 

DATE OF PROCEDURE:  01/19/2018

 

PROCEDURE PERFORMED:  Flexible sigmoidoscopy.

 

PREPROCEDURE DIAGNOSES:  Ulcerative colitis with adequate improvement, 10 days of IV steroids and 3 m
ore days of oral steroids.  There has been fluctuating level of C-reactive protein, stable hemoglobin
 and platelet count has slowly trended down.  However, clinically the patient is not improved and has
 persistent pain.  The point of endoscopy is to evaluate response to therapy and help plan possible n
eed for surgical intervention.

 

POSTPROCEDURE DIAGNOSES:  Severe colitis from about 70-80 cm down to the rectum through the sigmoid, 
there were significant ulcerations and erosions.  The transverse colon was normal and there was forme
d stool in the proximal transverse colon, ascending colon was not visualized.  Biopsies were taken to
 rule out CMV.

 

RECOMMENDATIONS:  Continue oral steroids and topical steroids rectally.  Remicade infusion next Frida
y, we will increase dose.  We will plan on sending her to home if she is stable overnight and coming 
in for a shot next week.

 

PROCEDURE IN DETAIL:  After the patient was informed of the risks, benefits, possible complications o
f endoscopy including perforation, bleeding, reactions to medication and aspiration, informed consent
 was obtained.  The patient brought to endoscopy suite where she was sedated in gradual fashion.  Onc
e she was comfortable, rectal exam was performed, which was normal.  The endoscope was advanced throu
gh the anal canal through the colon to the proximal transverse colon with formed stools encountered. 
 The transverse colon appeared normal with no overt colitis.   Descending colon was fairly normal, bu
t 70-80 cm from the anorectal verge all the way down to the rectum, there was pancolitis with ulcerat
ions, erosions and submucosal edema and hemorrhage.  There were no deep ulcers.  These were mainly up
hardwick like.  The colon was very friable and bleeding.  There was no evidence of perianal fistulas, per
ianal fissures, strictures or external disease.  Retroflexion views were not performed.  Biopsies wer
e taken for the above-noted studies, and the scope was removed.  The patient tolerated the procedure 
well with no complications.

## 2018-01-29 ENCOUNTER — HOSPITAL ENCOUNTER (INPATIENT)
Dept: HOSPITAL 92 - 3SE | Age: 23
LOS: 4 days | Discharge: HOME | DRG: 386 | End: 2018-02-02
Attending: INTERNAL MEDICINE | Admitting: INTERNAL MEDICINE
Payer: COMMERCIAL

## 2018-01-29 VITALS — BODY MASS INDEX: 22.6 KG/M2

## 2018-01-29 DIAGNOSIS — F41.9: ICD-10-CM

## 2018-01-29 DIAGNOSIS — Z88.5: ICD-10-CM

## 2018-01-29 DIAGNOSIS — E87.6: ICD-10-CM

## 2018-01-29 DIAGNOSIS — N39.0: ICD-10-CM

## 2018-01-29 DIAGNOSIS — Z90.49: ICD-10-CM

## 2018-01-29 DIAGNOSIS — L29.9: ICD-10-CM

## 2018-01-29 DIAGNOSIS — Z79.899: ICD-10-CM

## 2018-01-29 DIAGNOSIS — T36.8X5A: ICD-10-CM

## 2018-01-29 DIAGNOSIS — B95.2: ICD-10-CM
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
## 2018-01-29 DIAGNOSIS — K51.90: Primary | ICD-10-CM

## 2018-01-29 DIAGNOSIS — K59.00: ICD-10-CM

## 2018-01-29 DIAGNOSIS — J45.909: ICD-10-CM

## 2018-01-29 LAB
ALBUMIN SERPL BCG-MCNC: 3.4 G/DL (ref 3.5–5)
ALP SERPL-CCNC: 74 U/L (ref 40–150)
ALT SERPL W P-5'-P-CCNC: 13 U/L (ref 8–55)
ANION GAP SERPL CALC-SCNC: 11 MMOL/L (ref 10–20)
AST SERPL-CCNC: 9 U/L (ref 5–34)
BACTERIA UR QL AUTO: (no result) HPF
BASOPHILS # BLD AUTO: 0 THOU/UL (ref 0–0.2)
BASOPHILS NFR BLD AUTO: 0.4 % (ref 0–1)
BILIRUB SERPL-MCNC: 0.7 MG/DL (ref 0.2–1.2)
BUN SERPL-MCNC: 5 MG/DL (ref 7–18.7)
CALCIUM SERPL-MCNC: 9.3 MG/DL (ref 7.8–10.44)
CHLORIDE SERPL-SCNC: 100 MMOL/L (ref 98–107)
CO2 SERPL-SCNC: 28 MMOL/L (ref 22–29)
CREAT CL PREDICTED SERPL C-G-VRATE: 110 ML/MIN (ref 70–130)
CRYSTAL-AUWI FLAG: 0 (ref 0–15)
EOSINOPHIL # BLD AUTO: 0.1 THOU/UL (ref 0–0.7)
EOSINOPHIL NFR BLD AUTO: 0.7 % (ref 0–10)
GLOBULIN SER CALC-MCNC: 4.2 G/DL (ref 2.4–3.5)
GLUCOSE SERPL-MCNC: 109 MG/DL (ref 70–105)
HEV IGM SER QL: 0.8 (ref 0–7.99)
HGB BLD-MCNC: 12.8 G/DL (ref 12–16)
HYALINE CASTS #/AREA URNS LPF: (no result) LPF
LYMPHOCYTES # BLD: 1.2 THOU/UL (ref 1.2–3.4)
LYMPHOCYTES NFR BLD AUTO: 12.9 % (ref 21–51)
MAGNESIUM SERPL-MCNC: 1.7 MG/DL (ref 1.6–2.6)
MCH RBC QN AUTO: 31.2 PG (ref 27–31)
MCV RBC AUTO: 96.6 FL (ref 81–99)
MONOCYTES # BLD AUTO: 0.7 THOU/UL (ref 0.11–0.59)
MONOCYTES NFR BLD AUTO: 7.3 % (ref 0–10)
NEUTROPHILS # BLD AUTO: 7.1 THOU/UL (ref 1.4–6.5)
NEUTROPHILS NFR BLD AUTO: 78.6 % (ref 42–75)
PATHC CAST-AUWI FLAG: 0 (ref 0–2.49)
PLATELET # BLD AUTO: 418 THOU/UL (ref 130–400)
POTASSIUM SERPL-SCNC: 3.4 MMOL/L (ref 3.5–5.1)
PREGU CONTROL BACKGROUND?: (no result)
PREGU CONTROL BAR APPEAR?: YES
RBC # BLD AUTO: 4.08 MILL/UL (ref 4.2–5.4)
RBC UR QL AUTO: (no result) HPF (ref 0–3)
SODIUM SERPL-SCNC: 136 MMOL/L (ref 136–145)
SP GR UR STRIP: 1.01 (ref 1–1.04)
SPERM-AUWI FLAG: 0 (ref 0–9.9)
WBC # BLD AUTO: 9 THOU/UL (ref 4.8–10.8)
WBC UR QL AUTO: (no result) HPF (ref 0–3)
YEAST-AUWI FLAG: 0 (ref 0–25)

## 2018-01-29 PROCEDURE — A4216 STERILE WATER/SALINE, 10 ML: HCPCS

## 2018-01-29 PROCEDURE — 81025 URINE PREGNANCY TEST: CPT

## 2018-01-29 PROCEDURE — 74019 RADEX ABDOMEN 2 VIEWS: CPT

## 2018-01-29 PROCEDURE — 80048 BASIC METABOLIC PNL TOTAL CA: CPT

## 2018-01-29 PROCEDURE — 87046 STOOL CULTR AEROBIC BACT EA: CPT

## 2018-01-29 PROCEDURE — 87328 CRYPTOSPORIDIUM AG IA: CPT

## 2018-01-29 PROCEDURE — 85025 COMPLETE CBC W/AUTO DIFF WBC: CPT

## 2018-01-29 PROCEDURE — 84100 ASSAY OF PHOSPHORUS: CPT

## 2018-01-29 PROCEDURE — 71046 X-RAY EXAM CHEST 2 VIEWS: CPT

## 2018-01-29 PROCEDURE — 81001 URINALYSIS AUTO W/SCOPE: CPT

## 2018-01-29 PROCEDURE — 74177 CT ABD & PELVIS W/CONTRAST: CPT

## 2018-01-29 PROCEDURE — 87324 CLOSTRIDIUM AG IA: CPT

## 2018-01-29 PROCEDURE — 87086 URINE CULTURE/COLONY COUNT: CPT

## 2018-01-29 PROCEDURE — 87899 AGENT NOS ASSAY W/OPTIC: CPT

## 2018-01-29 PROCEDURE — 86140 C-REACTIVE PROTEIN: CPT

## 2018-01-29 PROCEDURE — 87077 CULTURE AEROBIC IDENTIFY: CPT

## 2018-01-29 PROCEDURE — 87449 NOS EACH ORGANISM AG IA: CPT

## 2018-01-29 PROCEDURE — 87045 FECES CULTURE AEROBIC BACT: CPT

## 2018-01-29 PROCEDURE — 87040 BLOOD CULTURE FOR BACTERIA: CPT

## 2018-01-29 PROCEDURE — 83630 LACTOFERRIN FECAL (QUAL): CPT

## 2018-01-29 PROCEDURE — 36415 COLL VENOUS BLD VENIPUNCTURE: CPT

## 2018-01-29 PROCEDURE — 83735 ASSAY OF MAGNESIUM: CPT

## 2018-01-29 PROCEDURE — 87329 GIARDIA AG IA: CPT

## 2018-01-29 PROCEDURE — 80053 COMPREHEN METABOLIC PANEL: CPT

## 2018-01-29 PROCEDURE — 87186 SC STD MICRODIL/AGAR DIL: CPT

## 2018-01-29 PROCEDURE — 83605 ASSAY OF LACTIC ACID: CPT

## 2018-01-29 PROCEDURE — S0028 INJECTION, FAMOTIDINE, 20 MG: HCPCS

## 2018-01-29 RX ADMIN — Medication SCH: at 15:11

## 2018-01-29 RX ADMIN — METRONIDAZOLE SCH MLS: 500 INJECTION, SOLUTION INTRAVENOUS at 13:51

## 2018-01-29 RX ADMIN — METRONIDAZOLE SCH MLS: 500 INJECTION, SOLUTION INTRAVENOUS at 22:37

## 2018-01-29 NOTE — HP
DATE OF ADMISSION:  01/29/2018

 

PRIMARY GASTROENTEROLOGIST:  Dr. Mustafa.

 

PRIMARY CARE PHYSICIAN:  Dr. Isaiah Woods at [facility].

 

CHIEF COMPLAINT:  Direct admit from Dr. Mustafa' office for Crohn's exacerbation.  Patient had bloody 
diarrhea.

 

HISTORY OF PRESENT ILLNESS:  The patient is a 22-year-old white female with inflammatory bowel diseas
e with recent hospitalization at this facility, presented to the emergency room with above complaints
.  The patient was discharged on 01/19/2018.  She was started on Humira per gastroenterologist.

 

Post-discharge, her symptoms continued to worsen.  She was seen by Dr. Mustafa today and was sent to [facility] for hospital admission.  She continued to have on and off abdominal discomfort with blood
y diarrhea.  She has not been eating well due to abdominal discomfort.  She denied any nausea, vomiti
ng; however.  Abdominal pain was more or less generalized, moderate in intensity, aggravated by food.
  She currently takes prednisone as well as mercaptopurine every day.

 

PAST MEDICAL HISTORY:

1.  Inflammatory bowel disease.  Patient had a recent flare of Crohn's disease.

2.  Chronic diarrhea.

 

PAST SURGICAL HISTORY:

1.  Recent colonoscopy.

2.  Right hemicolectomy for ulcerative colitis.

 

ALLERGIES:  CODEINE.

 

CURRENT HOME MEDICATIONS:  As discussed above.

 

SOCIAL HISTORY:  Patient currently lives at home.  No tobacco, alcohol or drug use.

 

FAMILY HISTORY:  Positive for Crohn's disease and ulcerative colitis in mother and father.

 

REVIEW OF SYSTEMS:  The following complete review of systems was negative, unless otherwise mentioned
 in the HPI or below:

Constitutional:  Weight loss or gain, ability to conduct usual activities.

Skin:  Rash, itching.

Eyes:  Double vision, pain.

ENT/Mouth:  Nose bleeding, neck stiffness, pain, tenderness.

Cardiovascular:  Palpitations, dyspnea on exertion, orthopnea.

Respiratory:  Shortness of breath, wheezing, cough, hemoptysis, fever or night sweats.

Gastrointestinal:  Poor appetite, abdominal pain, heartburn, nausea, vomiting, constipation, or diarr
hea.

Genitourinary:  Urgency, frequency, dysuria, nocturia.

Musculoskeletal:  Pain, swelling.

Neurologic/Psychiatric:  Anxiety, depression.

Allergy/Immunologic:  Skin rash, bleeding tendency.

 

PHYSICAL EXAMINATION:

VITAL SIGNS:  Temperature 98.3, respiration of 18, pulse rate of 82, blood pressure 111/76 with O2 sa
turation 96% on room air.

GENERAL:  A 22-year-old female in mild distress due to abdominal discomfort.

HEENT:  Head is atraumatic, normocephalic.  Sclerae are anicteric.  Moist mucous membranes.  No oral 
lesion.

NECK:  Supple, no JVD appreciated.  No carotid bruit.

LUNGS:  Clear to auscultation bilaterally.

HEART:  S1, S2 present.  Regular rate and rhythm.  No murmur, rubs, or gallops appreciated.

ABDOMEN:  Soft, mild generalized tenderness, without any rebound, guarding, no costovertebral angle t
enderness.

EXTREMITIES:  No edema or calf tenderness.

NEUROLOGIC:  Grossly nonfocal, moves all four extremities.

PSYCHIATRIC:  Alert, awake, oriented x3.

SKIN:  Warm and dry.

LYMPH NODES:  No palpable lymph nodes in the neck.

PERIPHERAL VASCULAR:  Radial pulses palpable bilaterally.

MUSCULOSKELETAL:  No joint swelling or tenderness.

 

LABORATORY DATA:  CBC showed WBC 9 with hemoglobin of 12.8, hematocrit 39.4, platelet 418.  Chemistri
es showed sodium 136, potassium 3.4, chloride 100, bicarbonate 28, BUN of 5, creatinine 0.66, CRP of 
8.2.  Urine pregnancy test was negative.  Urinalysis was negative.

 

IMPRESSION AND PLAN:

1.  Exacerbation of Crohn's disease.

2.  Hematochezia secondary to #1.

3.  Hypokalemia.

4.  Elevated inflammatory markers.

5.  Elevated platelets probably secondary to inflammatory process.

6.  Chronic diarrhea.

 

PLAN:  The patient will be monitored in the medical floor.  She has been started on IV fluids, steroi
ds and Flagyl.  She had generalized itching after Cipro.  For this reason, Cipro has been discontinue
d.  GI has been consulted.  We will resume home mercaptopurine.  We will continue tramadol p.r.n. for
 pain.  Add fentanyl as needed.  Stool workup has been ordered.  Repeat labs in a.m.  Patient refused
 oral potassium supplementation.  We will repeat basic metabolic profile in a.m.

 

Plan of care was discussed with the patient and the family, they stated understanding.

## 2018-01-30 LAB
ALBUMIN SERPL BCG-MCNC: 2.8 G/DL (ref 3.5–5)
ALP SERPL-CCNC: 57 U/L (ref 40–150)
ALT SERPL W P-5'-P-CCNC: 11 U/L (ref 8–55)
ANION GAP SERPL CALC-SCNC: 9 MMOL/L (ref 10–20)
AST SERPL-CCNC: 6 U/L (ref 5–34)
BASOPHILS # BLD AUTO: 0 THOU/UL (ref 0–0.2)
BASOPHILS NFR BLD AUTO: 0.1 % (ref 0–1)
BILIRUB SERPL-MCNC: 0.5 MG/DL (ref 0.2–1.2)
BUN SERPL-MCNC: 6 MG/DL (ref 7–18.7)
CALCIUM SERPL-MCNC: 8.3 MG/DL (ref 7.8–10.44)
CHLORIDE SERPL-SCNC: 102 MMOL/L (ref 98–107)
CO2 SERPL-SCNC: 28 MMOL/L (ref 22–29)
CREAT CL PREDICTED SERPL C-G-VRATE: 123 ML/MIN (ref 70–130)
EOSINOPHIL # BLD AUTO: 0 THOU/UL (ref 0–0.7)
EOSINOPHIL NFR BLD AUTO: 0.1 % (ref 0–10)
GLOBULIN SER CALC-MCNC: 3.4 G/DL (ref 2.4–3.5)
GLUCOSE SERPL-MCNC: 117 MG/DL (ref 70–105)
HGB BLD-MCNC: 11.2 G/DL (ref 12–16)
LYMPHOCYTES # BLD: 1.5 THOU/UL (ref 1.2–3.4)
LYMPHOCYTES NFR BLD AUTO: 18.7 % (ref 21–51)
MCH RBC QN AUTO: 30.8 PG (ref 27–31)
MCV RBC AUTO: 96.6 FL (ref 81–99)
MONOCYTES # BLD AUTO: 0.5 THOU/UL (ref 0.11–0.59)
MONOCYTES NFR BLD AUTO: 6 % (ref 0–10)
NEUTROPHILS # BLD AUTO: 6 THOU/UL (ref 1.4–6.5)
NEUTROPHILS NFR BLD AUTO: 75.2 % (ref 42–75)
PLATELET # BLD AUTO: 378 THOU/UL (ref 130–400)
POTASSIUM SERPL-SCNC: 4.1 MMOL/L (ref 3.5–5.1)
RBC # BLD AUTO: 3.63 MILL/UL (ref 4.2–5.4)
SODIUM SERPL-SCNC: 135 MMOL/L (ref 136–145)
WBC # BLD AUTO: 8 THOU/UL (ref 4.8–10.8)

## 2018-01-30 RX ADMIN — METRONIDAZOLE SCH MLS: 500 INJECTION, SOLUTION INTRAVENOUS at 14:25

## 2018-01-30 RX ADMIN — METRONIDAZOLE SCH MLS: 500 INJECTION, SOLUTION INTRAVENOUS at 22:08

## 2018-01-30 RX ADMIN — Medication SCH: at 21:43

## 2018-01-30 RX ADMIN — METRONIDAZOLE SCH MLS: 500 INJECTION, SOLUTION INTRAVENOUS at 06:15

## 2018-01-30 RX ADMIN — Medication SCH: at 09:59

## 2018-01-30 NOTE — PDOC.PN
- Subjective


Encounter Start Date: 01/30/18


Encounter Start Time: 14:00





No new complaints. No overnight events





- Objective


Resuscitation Status: 


 











Resuscitation Status           FULL:Full Resuscitation














MAR Reviewed: Yes


Vital Signs & Weight: 


 Vital Signs (12 hours)











  Temp Pulse Resp BP Pulse Ox


 


 01/30/18 11:43  97.9 F  55 L  18  113/77 


 


 01/30/18 09:30  97.9 F  55 L  18  


 


 01/30/18 08:00  98.6 F  53 L  18  109/69  99


 


 01/30/18 04:45  97.5 F L  63  16  102/67  97








 Weight











Admit Weight                   116 lb


 


Weight                         116 lb














I&O: 


 











 01/29/18 01/30/18 01/31/18





 06:59 06:59 06:59


 


Intake Total  3905 


 


Output Total  600 


 


Balance  3305 











Result Diagrams: 


 01/30/18 07:30





 01/30/18 07:30





Phys Exam





- Physical Examination


Constitutional: NAD





Dx/Plan





- Plan


DVT proph w/SCDs





MPRESSION AND PLAN:


1.  Exacerbation of Crohn's disease


2.  Hematochezia secondary to #1.


3.  Fever prob due to #1


3.  Hypokalemia. resolved


4.  Elevated inflammatory markers.


5.  Elevated platelets probably secondary to inflammatory process.


6.  Chronic diarrhea.





PLAN:


* Cont current meds as below


* Further mngt per GI

## 2018-01-30 NOTE — RAD
TWO VIEWS ABDOMEN:

 

Date: 1-30-18 

 

Comparison: None. 

 

History: Fever, ulcerative colitis. 

 

FINDINGS: 

Upright imaging demonstrates no free intraperitoneal air. The bowel gas pattern appears nonobstructed
. There is a bowel suture line in the right upper quadrant. 

 

IMPRESSION: 

No evidence for free intraperitoneal air of small bowel obstruction. 

 

POS: [facility]

## 2018-01-30 NOTE — CON
DATE OF CONSULTATION:  01/29/2018

 

HISTORY OF PRESENT ILLNESS:  Ms. Abernathy is a 22-year-old with inflammatory bowel disease diagnosed in
 2013.  At that time, she underwent a right hemicolectomy secondary to perforation. It was felt that 
she probably had Crohn's; however, she had no ileal disease and has mainly had left-sided colitis sin
ce that time.  In February 2017, she had some mild active colitis in her sigmoid and rectum, but had 
been maintained for about 5 years on Remicade.  She did have to have dose escalation to 10 mg/kg, and
 ultimately lost the effect and noticed some dirrahea this past Fall.  She had antibody to infliximab
 and moderately low level trough infliximab.  It was decided to go ahead and change to Humira as she 
has had such good response for so long.  She did have an infectious workup at that time, negative for
 TB, CMV and histoplasmosis.  She was admitted to the hospital in early January, having multiple stoo
ls with blood mucus, 1-2 tablespoons every 20 to 30 minutes, with diffuse lower abdominal pain.  She 
was still taking her mercaptopurine.  She did not want to try oral prednisone at that time secondary 
to acne and weight gain it would cause, but ultimately, she tried that in the outpatient setting befo
re being admitted.  Her last admission she was here, she had a sigmoidoscopy showing disease to 60 cm
, above this colon was clear and actually formed stool was noted in the transverse colon.  On 01/08/2
018, she had a endoscopy that showed chronic active colitis, severe, in the sigmoid colon.  After hig
h dose steroids, trial of 5-ASA which she did not tolerate, and trial of some Dameon enemas which she d
id not hold in well.  She had a repeat endoscopy on the 19th.  She also had her induction doses of Re
micade, Humira by that time.  On that exam, she had chronic mild active colitis in the transverse col
on, chronic mild active colitis splenic flexure and chronic moderate-to-severe colitis in the sigmoid
 colon.  There were no signs of infection on those biopsies and CMV, DNA was negative.  Ultimately, s
he seemed to be improving somewhat, we discharged her home to come to the office for second dose of H
umira.  She had doses of Humira 80 mg 2 weeks ago and this Monday she came in today for her next dose
 as we are going to place her on 40 mg for 1 week.  She has been on higher dose of Remicade.  In the 
office, she was tachycardic to 130 noted she has been having some fever up to 101 at home and the dec
ision was made to be admitted to the hospital.  She denies any fever, any chills.  Her family notes s
he has had some cough.  She did have some temperature before she came to the office and that is when 
her mother gave her Tylenol, which she had no fever in the office, her pulse was high.

 

PAST MEDICAL HISTORY:  Includes indeterminate colitis, asthma and anxiety.

 

PAST SURGICAL HISTORY:  Right hemicolectomy secondary to perforation.

 

ALLERGIES:  CODEINE.

 

MEDICATIONS AT HOME:  Humira.  She has finished loading of getting 40 mg a week, prednisone 40 mg a d
ay, mercaptopurine 50 mg day.

 

SOCIAL HISTORY:  Patient lives at home.  She is about to get  in 40 days.  No tobacco, alcohol
, or drugs.

 

FAMILY HISTORY:  Negative for Crohn's disease, ulcerative colitis, in mother and father.

 

REVIEW OF SYSTEMS:  Negative for arthralgias, myalgias, skin rashes, oral ulcers, lesions, or thrush,
 vaginal candidiasis, mild cough.  No shortness of breath or wheezing, no dysuria, frequency, or urge
ncy.

 

PHYSICAL EXAMINATION:

VITAL SIGNS:  Temperature is 98.3 here, respirations 18 with a pulse of 82 on admission, blood pressu
re 113/73.  She is pale.

HEENT:  Oropharynx without lesions.  There is no thrush.

NECK:  Supple.

LUNGS:  Clear.

HEART:  Regular rate and rhythm without clicks or murmurs.

ABDOMEN:  Soft and nontender.  There is no rebound.  There is no guarding.

EXTREMITIES:  Reveal no clubbing, cyanosis, or edema.

SKIN:  Without rash or lesions.

 

MEDICATIONS:  She has been started on Solu-Medrol 20 IV q.8, Zofran p.r.n., Flagyl 500 IV q.8, she wa
s placed on Cipro IV, but had a rash and this was discontinued, mercaptopurine 50, fentanyl p.r.n., P
epcid, Tums p.r.n.  She was given a liter of bolus when she came here and now is on 125 mL an hour.  
She is on Florastor as well.

 

Microbiology:  Stool for C. diff negative.  Stool lactoferrin positive.  Campylobacter negative.  Oanh
ga toxin is negative.  Rapid parasite screen is negative, Giardia and Cryptosporidia.

 

LABORATORY STUDIES:  White count 9, hemoglobin 12, platelets 418 when she was in the hospital, she wa
s high as 500.  On 01/16, she was 447, and on 01/18, 418, on 01/23, 426.  Sed rate on the 01/23 was 2
7.  CRP is 8.2, it was 4.2 on the 01/23, is 2.5 on the 01/04 and was 0.87 on 12/18/2017, it was 0.5 o
n 1/13/2018.  Comprehensive metabolic profile shows potassium of 3.4.  Liver function tests were norm
al.  Phosphorus 2.5, magnesium 1.7, albumin 3.4.

 

ASSESSMENT:

1.  Left side colitis, severe, and she has been started on Humira and has had her first maintenance d
ose today in the office, so far she is not responding.  Infectious etiologies have been ruled out.  ANDREEA carcamo will ask pathology tomorrow, send her last biopsies for CMV stains, although the CMV DNA has been n
egative.  We will talk with inflammatory bowel disease specialist in [place].  She has an appointment
 within a couple of weeks to see if they have any recommendations or opinions on other things we coul
d try.  It may be that we need to move on to another medication, although she has really only been on
 3 weeks of the Humira.  At this time, there are no signs of toxic megacolon.

2.  Fever.  This is likely related to colitis.  We sent urine cultures and blood cultures.  We will g
et a chest x-ray in the morning and x-ray her belly in the morning.

3.  We have tried adding 5 ASA, which she has not tolerated, although I do not think they would do mu
ch.  If her x-ray looks okay tomorrow, we will add a Dameon enema.

 

I have discussed with her and her fiance and mother who are at the bedside.  In the possibility that 
she does not improve, she may ultimately need a colectomy.  At this point in time, there is no signs 
that she needs an urgent colectomy.

## 2018-01-31 LAB
BASOPHILS # BLD AUTO: 0 THOU/UL (ref 0–0.2)
BASOPHILS NFR BLD AUTO: 0 % (ref 0–1)
EOSINOPHIL # BLD AUTO: 0 THOU/UL (ref 0–0.7)
EOSINOPHIL NFR BLD AUTO: 0.3 % (ref 0–10)
HGB BLD-MCNC: 10.9 G/DL (ref 12–16)
LYMPHOCYTES # BLD: 1.3 THOU/UL (ref 1.2–3.4)
LYMPHOCYTES NFR BLD AUTO: 16 % (ref 21–51)
MCH RBC QN AUTO: 31.1 PG (ref 27–31)
MCV RBC AUTO: 96.7 FL (ref 81–99)
MONOCYTES # BLD AUTO: 0.7 THOU/UL (ref 0.11–0.59)
MONOCYTES NFR BLD AUTO: 8 % (ref 0–10)
NEUTROPHILS # BLD AUTO: 6.3 THOU/UL (ref 1.4–6.5)
NEUTROPHILS NFR BLD AUTO: 75.7 % (ref 42–75)
PLATELET # BLD AUTO: 502 THOU/UL (ref 130–400)
RBC # BLD AUTO: 3.49 MILL/UL (ref 4.2–5.4)
WBC # BLD AUTO: 8.3 THOU/UL (ref 4.8–10.8)

## 2018-01-31 RX ADMIN — Medication SCH: at 10:04

## 2018-01-31 RX ADMIN — Medication SCH: at 23:17

## 2018-01-31 RX ADMIN — METRONIDAZOLE SCH: 500 INJECTION, SOLUTION INTRAVENOUS at 14:43

## 2018-01-31 RX ADMIN — METRONIDAZOLE SCH MLS: 500 INJECTION, SOLUTION INTRAVENOUS at 21:18

## 2018-01-31 RX ADMIN — METRONIDAZOLE SCH MLS: 500 INJECTION, SOLUTION INTRAVENOUS at 16:11

## 2018-01-31 RX ADMIN — METRONIDAZOLE SCH MLS: 500 INJECTION, SOLUTION INTRAVENOUS at 06:29

## 2018-01-31 NOTE — PDOC.PN
- Subjective


Encounter Start Date: 01/31/18


Encounter Start Time: 16:33





Ms. Abernathy was seen in follow-up. She notes more abdominal soreness this 

morning. She has had formed stool, with some blood. She also feels a bit 

feverish as well.





- Objective


Resuscitation Status: 


 











Resuscitation Status           FULL:Full Resuscitation














MAR Reviewed: Yes


Vital Signs & Weight: 


 Vital Signs (12 hours)











  Temp Pulse Resp BP Pulse Ox


 


 01/31/18 13:47  99.2 F    


 


 01/31/18 12:00  98.5 F  60  20  113/65 


 


 01/31/18 10:00  98.8 F  53 L  16  


 


 01/31/18 08:00  98.8 F  53 L  16  100/69  98








 Weight











Admit Weight                   116 lb


 


Weight                         116 lb














I&O: 


 











 01/30/18 01/31/18 02/01/18





 06:59 06:59 06:59


 


Intake Total 3905 4185 


 


Output Total 600  


 


Balance 3305 4185 











Result Diagrams: 


 01/31/18 06:06





 01/30/18 07:30





Phys Exam





- Physical Examination


Patient sitting up eating- so abdominal exam not done


HEENT: PERRLA


Respiratory: no wheezing, no rales, no rhonchi, clear to auscultation bilateral


Musculoskeletal: no edema





Dx/Plan


(1) Exacerbation of ulcerative colitis


Code(s): K51.90 - ULCERATIVE COLITIS, UNSPECIFIED, WITHOUT COMPLICATIONS   

Status: Acute   


Qualifiers: 


   Digestive disease complication type: with rectal bleeding   Qualified Code(s)

: K51.911 - Ulcerative colitis, unspecified with rectal bleeding   





- Plan





* Ulcerative Colitis with Flair- CT scan of the abdomen is pending


* Continue as per GI recommendations.

## 2018-01-31 NOTE — PRG
DATE OF SERVICE:  01/30/2018

 

SUBJECTIVE:  Ms. Abernathy feels a little bit better.  No rigors or chills.

 

PHYSICAL EXAMINATION:

VITAL SIGNS:  Temperature is 100.2 last night at 2200.  Today, she has been afebrile, pulse 59, blood
 pressure 108/69.

LUNGS:  Clear.

HEART:  Regular rate and rhythm.

ABDOMEN:  Nontender.

 

LABORATORY STUDIES:  Today, white count 8, hemoglobin was 11.2, platelet count 378.  Sodium 135, pota
ssium 4.1, electrolytes are normal.  Liver function tests normal.  Albumin 2.8, protein 6.2.  She has
 a negative UA, negative white blood cells.  Microbiology:  Blood cultures negative thus far, urine n
egative thus far.  Stool negative.  Chest x-ray was normal.  Abdomen x-ray, no toxic megacolon.

 

ASSESSMENT AND PLAN:  Severe colitis, left side.  Discussed with his IBD specialist in [place] yester
day, she has appointment coming up on the 12th.  After reviewing the case, he suggested pushing the h
igher doses of Humira.  She got 40 mg yesterday after finishing her loading dose the week before at 6
0 and 80.  We will go and give her another 40 today for a total of 80 this week.  We will continue he
r IV steroids.  We will hold off on the enema.  Reviewing her x-ray, she has got a lot of stool in th
e right colon, I am going to give her a little bit of MiraLax to see if we can move that along so she
 does not get a high impaction.  We will continue to observe.

## 2018-02-01 LAB
ANION GAP SERPL CALC-SCNC: 10 MMOL/L (ref 10–20)
BASOPHILS # BLD AUTO: 0 THOU/UL (ref 0–0.2)
BASOPHILS NFR BLD AUTO: 0.3 % (ref 0–1)
BUN SERPL-MCNC: (no result) MG/DL (ref 7–18.7)
CALCIUM SERPL-MCNC: 7.9 MG/DL (ref 7.8–10.44)
CHLORIDE SERPL-SCNC: 105 MMOL/L (ref 98–107)
CO2 SERPL-SCNC: 26 MMOL/L (ref 22–29)
CREAT CL PREDICTED SERPL C-G-VRATE: 140 ML/MIN (ref 70–130)
EOSINOPHIL # BLD AUTO: 0 THOU/UL (ref 0–0.7)
EOSINOPHIL NFR BLD AUTO: 0.5 % (ref 0–10)
GLUCOSE SERPL-MCNC: 134 MG/DL (ref 70–105)
HGB BLD-MCNC: 9.7 G/DL (ref 12–16)
LYMPHOCYTES # BLD: 1.3 THOU/UL (ref 1.2–3.4)
LYMPHOCYTES NFR BLD AUTO: 19.8 % (ref 21–51)
MCH RBC QN AUTO: 31.4 PG (ref 27–31)
MCV RBC AUTO: 96.8 FL (ref 81–99)
MONOCYTES # BLD AUTO: 0.7 THOU/UL (ref 0.11–0.59)
MONOCYTES NFR BLD AUTO: 10.2 % (ref 0–10)
NEUTROPHILS # BLD AUTO: 4.5 THOU/UL (ref 1.4–6.5)
NEUTROPHILS NFR BLD AUTO: 69.2 % (ref 42–75)
PLATELET # BLD AUTO: 425 THOU/UL (ref 130–400)
POTASSIUM SERPL-SCNC: 3.7 MMOL/L (ref 3.5–5.1)
RBC # BLD AUTO: 3.09 MILL/UL (ref 4.2–5.4)
SODIUM SERPL-SCNC: 137 MMOL/L (ref 136–145)
WBC # BLD AUTO: 6.5 THOU/UL (ref 4.8–10.8)

## 2018-02-01 RX ADMIN — METRONIDAZOLE SCH MLS: 500 INJECTION, SOLUTION INTRAVENOUS at 22:16

## 2018-02-01 RX ADMIN — METRONIDAZOLE SCH MLS: 500 INJECTION, SOLUTION INTRAVENOUS at 14:13

## 2018-02-01 RX ADMIN — Medication SCH: at 09:05

## 2018-02-01 RX ADMIN — Medication SCH: at 20:17

## 2018-02-01 RX ADMIN — METRONIDAZOLE SCH MLS: 500 INJECTION, SOLUTION INTRAVENOUS at 06:02

## 2018-02-01 NOTE — PRG
DATE OF SERVICE:  02/01/2018

 

SUBJECTIVE:  Ms. Abernathy feels better today.  She had a pretty bad day yesterday and she was really th
ink about and asking about surgery for colitis.  She has only had 1 bowel movement today with scant b
lood.  She had several bowel movements with the stool softener yesterday and with the oral contrast f
or CAT scan.

 

OBJECTIVE:

VITAL SIGNS:  Temperature is 98, pulse 70, blood pressure 113/75.

ABDOMEN:  Soft, nontender.

 

LABORATORY STUDIES:  White count 6.5, hemoglobin 9.7, platelet count 425.  Basic metabolic profile no
rmal.  Urine culture from the 29th has grown out greater than 100,000 Enterococcus sensitive to Bactr
im, penicillin, nitrofurantoin, fluoroquinolones.

 

CAT scan showed quite a bit of stool in the right colon and sigmoid colon, descending colon thickenin
g with no pericolonic fat stranding or pericolonic fluid collection.

 

ASSESSMENT:

1.  Left-sided colitis, possibly improving with high dose Humira.  She was still having less blood in
 the last 24 hours and decreased from today for sure.

2.  Constipation right-sided, improved.

3.  Urinary tract infection with Enterococcus.

 

PLAN:

1.  Macrodantin.

2.  Continue other antibiotics and steroids.  If she continues to improve, we will consider discharge
 in 24-48 hours.

## 2018-02-01 NOTE — PRG
DATE OF SERVICE:  01/31/2018

 

SUBJECTIVE:  Ms. Abernathy yesterday complains about still not having any bowel movements.  She had take
n her Humira on Tuesday, which we brought to her.

 

OBJECTIVE:

VITAL SIGNS:  T-max of 99.5 on 01/30/2018 at 2000 hours, pulse 50, temperature 98, blood pressure 111
/71.

ABDOMEN:  Soft and nontender.

LUNGS:  Clear.

 

LABORATORY STUDIES:  White count 8.3, hemoglobin 10.9, platelet count 502.  Urinalysis was negative. 
 Microbiology showed Enterococcus.  Urine culture is still pending as of the 01/30/2018.  Blood cultu
res negative.  Stool showed normal E. coli, but no _____ of the E. coli.  C. diff toxin was negative.
  Lactoferrin was positive.

 

ASSESSMENT:  Ulcerative colitis, left-sided.  We are going to get a CAT scan on the 1st.  The plain f
ilms showed quite a bit of stool in the right colon.  This may be causing her pain when she would eat
 last admission.  In accordance with recommendations for an IBD specialist in [place], we gave 80 mg 
of Humira this week.  We will continue to do that weekly so we can get the _____ to calm down.

## 2018-02-01 NOTE — PDOC.PN
- Subjective


Encounter Start Date: 02/01/18


Encounter Start Time: 12:14





Ms. Abernathy says the abdominal pain is much better today. I discussed the urine 

culture results with her. She tells me she is not having any symptoms. She says 

when she was younger, she had a catheter placed in her bladder, and this was 

pulled out, and left a "pouch" and she is always colonized with bacteria.





- Objective


Resuscitation Status: 


 











Resuscitation Status           FULL:Full Resuscitation














MAR Reviewed: Yes


Vital Signs & Weight: 


 Vital Signs (12 hours)











  Temp Pulse Resp BP


 


 02/01/18 12:00  98.7 F  70  20  113/75


 


 02/01/18 09:00  98.1 F  55 L  20 


 


 02/01/18 08:00  98.1 F  55 L  20  111/71


 


 02/01/18 04:00  97.8 F  53 L  18  109/75








 Weight











Admit Weight                   116 lb


 


Weight                         116 lb














I&O: 


 











 01/31/18 02/01/18 02/02/18





 06:59 06:59 06:59


 


Intake Total 4185 2300 


 


Balance 4185 2300 











Result Diagrams: 


 02/01/18 05:52





 02/01/18 05:52





Phys Exam





- Physical Examination


HEENT: PERRLA


Respiratory: no wheezing, no rales, no rhonchi, clear to auscultation bilateral


Cardiovascular: RRR, no significant murmur, no rub


Gastrointestinal: soft, non-tender, positive bowel sounds


Musculoskeletal: no edema





Dx/Plan


(1) Exacerbation of ulcerative colitis


Code(s): K51.90 - ULCERATIVE COLITIS, UNSPECIFIED, WITHOUT COMPLICATIONS   

Status: Acute   


Qualifiers: 


   Digestive disease complication type: with rectal bleeding   Qualified Code(s)

: K51.911 - Ulcerative colitis, unspecified with rectal bleeding   





- Plan





* UTI- she is not symptomatic from this, and could be due to asymptomatic 

bactiuria- will watch for now, and should she develop symptoms then we will 

treat


* UC Flair- Await CT scan results


* Continue treatment as outlined by Dr. Mustafa.

## 2018-02-01 NOTE — CT
CT ABDOMEN AND PELVIS WITH CONTRAST:

 

Comparison: 4-4-13

 

History: History of ulcerative colitis with flair of ulcerative colitis. History of Crohn's disease. 
Patient is had prior colon resection. Patient complains of abdominal pain. 

 

Technique: Multiple contiguous axial images were obtained in a CT of the abdomen and pelvis with cont
rast. PO contrast was administered. Coronal reformats were performed. 

 

FINDINGS: 

There is thickening in the wall of the sigmoid colon. This extends down to the level of the rectum. T
here is also mild thickening of the wall of the left colon. The transverse colon and right colon show
 no significant wall thickening. The small bowel is normal in caliber. There is mild stranding change
 surrounding the sigmoid colon. A small amount of free fluid is seen in the pelvis. No free air is id
entified. 

 

The liver, gallbladder, kidneys, adrenal glands, spleen, and pancreas are unremarkable. No abdominal 
or pelvic lymphadenopathy are seen. The reproductive organs are unremarkable.  

 

The osseous structures, visualized upper thorax and abdominal wall soft tissues are unremarkable. 

 

IMPRESSION: 

There is thickening of the wall of the sigmoid colon and left colon consistent with the patient's nadeen
gnosis of ulcerative colitis. 

 

POS: R[facility]NA

## 2018-02-02 VITALS — SYSTOLIC BLOOD PRESSURE: 124 MMHG | TEMPERATURE: 98.3 F | DIASTOLIC BLOOD PRESSURE: 81 MMHG

## 2018-02-02 RX ADMIN — METRONIDAZOLE SCH MLS: 500 INJECTION, SOLUTION INTRAVENOUS at 05:52

## 2018-02-02 RX ADMIN — METRONIDAZOLE SCH: 500 INJECTION, SOLUTION INTRAVENOUS at 14:53

## 2018-02-03 NOTE — DIS
DATE OF ADMISSION:  01/29/2018

 

DATE OF DISCHARGE:  02/02/2018

 

ADMITTING DIAGNOSES:

1.  Refractory ulcerative colitis.

2.  Fever.

3.  Tachycardia.

 

DISCHARGE DIAGNOSES:

1.  Fever, resolved, likely related to ulcerative colitis.

2.  Status post an extra 40 mg of Humira loading with improvement in colitis to dropping from 15 to a
bout 3 bowel movements today with less blood.

3.  Constipation noted on imaging, resolved with laxatives.

4.  Escherichia coli urinary tract infection with Enterococcus.

5.  Allergy to FLOXINS.

 

DISCHARGE DISPOSITION:

1.  Low residue diet.

2.  The patient will go home and follow up with me in the office for next Humira injection in our off
ice on Monday.

 

DISCHARGE MEDICATIONS:  Continue prednisone 40 mg daily, mercaptopurine 50 mg daily, tramadol p.r.n. 
for cramps, Humira, Colace daily, stool softener for constipation.

 

HOSPITAL COURSE:  The patient admitted to the hospital and started on IV Flagyl and Levaquin.  After 
all cultures were obtained, she did have a reaction to IV Levaquin, so this was discontinued.  All bl
ood cultures, stool for routine cultures, and stool for C. diff were all negative.  Urine ultimately 
revealed the E. coli.  Urinalysis showed some trace blood and the E. coli may have been fecal contami
nant.  With regard to her fever, chest x-ray was performed as well which was normal.  Evaluation for 
CMV, histoplasmosis, and TB had been informed at previous hospitalization.

 

She defervesced very quickly.  She is to continue on Flagyl throughout the hospitalization and ciprof
loxacin was discontinued.  For 48 hours prior to discharge, she was having no fever, no tachycardia, 
3-4 bowel movements a day with marked decreased amount of blood.

## 2018-02-03 NOTE — DIS
DATE OF DISCHARGE:  02/02/2018

 

DISCHARGE DISPOSITION:  Home.

 

FOLLOWUP:

1.  Follow up with primary care physician, Dr. Woods next week.

2.  Follow up with Dr. Mustafa as scheduled.

 

The patient was seen and examined on the day of discharge.  Denies any new complaints.  Abdominal harish
n has improved.

 

BRIEF HOSPITAL COURSE:  The patient is a 22-year-old female with refractory ulcerative colitis who wa
s admitted on 01/29/2018 with abdominal discomfort and bloody diarrhea.  Her workup was consistent wi
th refractory ulcerative colitis.  She was started on IV steroids.  She also received Humira dosing p
er Dr. Mustafa.  Workup was also consistent with Enterococcus urinary tract infection that was treated
 with Macrodantin.  She has been cleared by Dr. Mustafa for discharge.

 

FINAL DIAGNOSES:

1.  Refractory ulcerative colitis.

2.  Fever secondary to #1.

3.  Constipation, resolved.

4.  Enterococcus urinary tract infection.

5.  CIPROFLOXACIN and CODEINE allergy.

6.  Hypokalemia, corrected.

7.  Chronic diarrhea.

8.  Elevated platelets secondary to inflammatory process.

## 2018-02-18 ENCOUNTER — HOSPITAL ENCOUNTER (EMERGENCY)
Dept: HOSPITAL 92 - ERS | Age: 23
Discharge: HOME | End: 2018-02-18
Payer: COMMERCIAL

## 2018-02-18 DIAGNOSIS — K58.9: ICD-10-CM

## 2018-02-18 DIAGNOSIS — F32.9: ICD-10-CM

## 2018-02-18 DIAGNOSIS — Z79.899: ICD-10-CM

## 2018-02-18 DIAGNOSIS — F41.9: ICD-10-CM

## 2018-02-18 DIAGNOSIS — M79.81: Primary | ICD-10-CM

## 2018-02-18 LAB
ALBUMIN SERPL BCG-MCNC: 3.7 G/DL (ref 3.5–5)
ALP SERPL-CCNC: 99 U/L (ref 40–150)
ALT SERPL W P-5'-P-CCNC: 43 U/L (ref 8–55)
ANION GAP SERPL CALC-SCNC: 12 MMOL/L (ref 10–20)
APTT PPP: 33.4 SEC (ref 22.9–36.1)
AST SERPL-CCNC: 28 U/L (ref 5–34)
BACTERIA UR QL AUTO: (no result) HPF
BASOPHILS # BLD AUTO: 0.1 THOU/UL (ref 0–0.2)
BASOPHILS NFR BLD AUTO: 0.3 % (ref 0–1)
BILIRUB SERPL-MCNC: 0.3 MG/DL (ref 0.2–1.2)
BUN SERPL-MCNC: 9 MG/DL (ref 7–18.7)
CALCIUM SERPL-MCNC: 9.5 MG/DL (ref 7.8–10.44)
CHLORIDE SERPL-SCNC: 100 MMOL/L (ref 98–107)
CK SERPL-CCNC: 11 U/L (ref 29–168)
CO2 SERPL-SCNC: 27 MMOL/L (ref 22–29)
CREAT CL PREDICTED SERPL C-G-VRATE: 0 ML/MIN (ref 70–130)
EOSINOPHIL # BLD AUTO: 0.2 THOU/UL (ref 0–0.7)
EOSINOPHIL NFR BLD AUTO: 1 % (ref 0–10)
GLOBULIN SER CALC-MCNC: 3.6 G/DL (ref 2.4–3.5)
GLUCOSE SERPL-MCNC: 149 MG/DL (ref 70–105)
HGB BLD-MCNC: 12.3 G/DL (ref 12–16)
HYALINE CASTS #/AREA URNS LPF: (no result) LPF
INR PPP: 0.9
LIPASE SERPL-CCNC: 20 U/L (ref 8–78)
LYMPHOCYTES # BLD: 2.2 THOU/UL (ref 1.2–3.4)
LYMPHOCYTES NFR BLD AUTO: 13.7 % (ref 21–51)
MAGNESIUM SERPL-MCNC: 2.1 MG/DL (ref 1.6–2.6)
MCH RBC QN AUTO: 30.9 PG (ref 27–31)
MCV RBC AUTO: 93.7 FL (ref 81–99)
MONOCYTES # BLD AUTO: 0.3 THOU/UL (ref 0.11–0.59)
MONOCYTES NFR BLD AUTO: 2 % (ref 0–10)
NEUTROPHILS # BLD AUTO: 13.1 THOU/UL (ref 1.4–6.5)
NEUTROPHILS NFR BLD AUTO: 83 % (ref 42–75)
PLATELET # BLD AUTO: 482 THOU/UL (ref 130–400)
POTASSIUM SERPL-SCNC: 4.6 MMOL/L (ref 3.5–5.1)
PREGS CONTROL BACKGROUND?: (no result)
PREGS CONTROL BAR APPEAR?: YES
PROTHROMBIN TIME: 12.5 SEC (ref 12–14.7)
RBC # BLD AUTO: 3.98 MILL/UL (ref 4.2–5.4)
RBC UR QL AUTO: (no result) HPF (ref 0–3)
SODIUM SERPL-SCNC: 134 MMOL/L (ref 136–145)
SP GR UR STRIP: 1.01 (ref 1–1.03)
WBC # BLD AUTO: 15.8 THOU/UL (ref 4.8–10.8)

## 2018-02-18 PROCEDURE — 83690 ASSAY OF LIPASE: CPT

## 2018-02-18 PROCEDURE — 81015 MICROSCOPIC EXAM OF URINE: CPT

## 2018-02-18 PROCEDURE — 36415 COLL VENOUS BLD VENIPUNCTURE: CPT

## 2018-02-18 PROCEDURE — 85610 PROTHROMBIN TIME: CPT

## 2018-02-18 PROCEDURE — 80053 COMPREHEN METABOLIC PANEL: CPT

## 2018-02-18 PROCEDURE — 84703 CHORIONIC GONADOTROPIN ASSAY: CPT

## 2018-02-18 PROCEDURE — 83735 ASSAY OF MAGNESIUM: CPT

## 2018-02-18 PROCEDURE — 84100 ASSAY OF PHOSPHORUS: CPT

## 2018-02-18 PROCEDURE — 81003 URINALYSIS AUTO W/O SCOPE: CPT

## 2018-02-18 PROCEDURE — 99284 EMERGENCY DEPT VISIT MOD MDM: CPT

## 2018-02-18 PROCEDURE — 82550 ASSAY OF CK (CPK): CPT

## 2018-02-18 PROCEDURE — 85730 THROMBOPLASTIN TIME PARTIAL: CPT

## 2018-02-18 PROCEDURE — 85025 COMPLETE CBC W/AUTO DIFF WBC: CPT

## 2018-02-20 ENCOUNTER — HOSPITAL ENCOUNTER (EMERGENCY)
Dept: HOSPITAL 92 - ERS | Age: 23
Discharge: HOME | End: 2018-02-20
Payer: COMMERCIAL

## 2018-02-20 DIAGNOSIS — D72.829: ICD-10-CM

## 2018-02-20 DIAGNOSIS — E86.0: Primary | ICD-10-CM

## 2018-02-20 DIAGNOSIS — F32.9: ICD-10-CM

## 2018-02-20 DIAGNOSIS — F41.9: ICD-10-CM

## 2018-02-20 LAB
ALBUMIN SERPL BCG-MCNC: 3.3 G/DL (ref 3.5–5)
ALP SERPL-CCNC: 89 U/L (ref 40–150)
ALT SERPL W P-5'-P-CCNC: 29 U/L (ref 8–55)
ANION GAP SERPL CALC-SCNC: 12 MMOL/L (ref 10–20)
AST SERPL-CCNC: 12 U/L (ref 5–34)
BACTERIA UR QL AUTO: (no result) HPF
BILIRUB SERPL-MCNC: 0.3 MG/DL (ref 0.2–1.2)
BUN SERPL-MCNC: 15 MG/DL (ref 7–18.7)
CALCIUM SERPL-MCNC: 9 MG/DL (ref 7.8–10.44)
CHLORIDE SERPL-SCNC: 100 MMOL/L (ref 98–107)
CO2 SERPL-SCNC: 24 MMOL/L (ref 22–29)
CREAT CL PREDICTED SERPL C-G-VRATE: 0 ML/MIN (ref 70–130)
CRYSTAL-AUWI FLAG: 0.1 (ref 0–15)
GLOBULIN SER CALC-MCNC: 3.3 G/DL (ref 2.4–3.5)
GLUCOSE SERPL-MCNC: 109 MG/DL (ref 70–105)
HEV IGM SER QL: 4.7 (ref 0–7.99)
HGB BLD-MCNC: 11.4 G/DL (ref 12–16)
HYALINE CASTS #/AREA URNS LPF: (no result) LPF
LIPASE SERPL-CCNC: 15 U/L (ref 8–78)
MCH RBC QN AUTO: 30.7 PG (ref 27–31)
MCV RBC AUTO: 94.8 FL (ref 81–99)
MDIFF COMPLETE?: YES
PATHC CAST-AUWI FLAG: 0.13 (ref 0–2.49)
PLATELET # BLD AUTO: 417 THOU/UL (ref 130–400)
PLATELET BLD QL SMEAR: (no result)
POTASSIUM SERPL-SCNC: 4.3 MMOL/L (ref 3.5–5.1)
RBC # BLD AUTO: 3.73 MILL/UL (ref 4.2–5.4)
RBC UR QL AUTO: (no result) HPF (ref 0–3)
SODIUM SERPL-SCNC: 132 MMOL/L (ref 136–145)
SP GR UR STRIP: 1.01 (ref 1–1.04)
SPERM-AUWI FLAG: 0 (ref 0–9.9)
WBC # BLD AUTO: 25.8 THOU/UL (ref 4.8–10.8)
WBC UR QL AUTO: (no result) HPF (ref 0–3)
YEAST-AUWI FLAG: 0 (ref 0–25)

## 2018-02-20 PROCEDURE — 85025 COMPLETE CBC W/AUTO DIFF WBC: CPT

## 2018-02-20 PROCEDURE — 36415 COLL VENOUS BLD VENIPUNCTURE: CPT

## 2018-02-20 PROCEDURE — 96360 HYDRATION IV INFUSION INIT: CPT

## 2018-02-20 PROCEDURE — 96361 HYDRATE IV INFUSION ADD-ON: CPT

## 2018-02-20 PROCEDURE — 93005 ELECTROCARDIOGRAM TRACING: CPT

## 2018-02-20 PROCEDURE — 81015 MICROSCOPIC EXAM OF URINE: CPT

## 2018-02-20 PROCEDURE — 71275 CT ANGIOGRAPHY CHEST: CPT

## 2018-02-20 PROCEDURE — 87086 URINE CULTURE/COLONY COUNT: CPT

## 2018-02-20 PROCEDURE — 74177 CT ABD & PELVIS W/CONTRAST: CPT

## 2018-02-20 PROCEDURE — 84100 ASSAY OF PHOSPHORUS: CPT

## 2018-02-20 PROCEDURE — 85379 FIBRIN DEGRADATION QUANT: CPT

## 2018-02-20 PROCEDURE — 83690 ASSAY OF LIPASE: CPT

## 2018-02-20 PROCEDURE — 81003 URINALYSIS AUTO W/O SCOPE: CPT

## 2018-02-20 PROCEDURE — 80053 COMPREHEN METABOLIC PANEL: CPT

## 2018-02-20 NOTE — CT
CT ABDOMEN AND PELVIS WITH CONTRAST:

 

Date:  02/20/18 

 

Multiple axial tomograms obtained through the abdomen and pelvis with IV enhancement. 

 

HISTORY: 

Abdominal pain. Possible infection at ostomy site. Pain at ostomy site. 

 

FINDINGS:

 

Lung bases are clear. 

 

Liver, spleen, and pancreas are unremarkable. 

 

Adrenal glands normal. Kidneys unremarkable. No hydronephrosis or calculi. Urinary bladder is distend
ed and appears unremarkable. 

 

Small bowel loops appear normal. 

 

Patient is post colectomy since the prior CT of 01/31/18. There is an ostomy in the right abdomen. Re
ctosigmoid stump is noted with suture line. No free fluid within the abdomen or pelvis. 

 

There is no fluid collection or evidence of abscess at the ostomy. 

 

Uterus and adnexa appear unremarkable. 

 

Nonspecific mesenteric lymph nodes are seen at the base of the mesentery, all subcentimeter in dimens
ion. 

 

IMPRESSION: 

Post colectomy since prior exam. Ostomy in right abdomen is noted. There is no evidence of fluid or a
bscess collection at the ostomy site by CT. 

 

 

POS: RIANNA

## 2018-02-20 NOTE — CT
CTA OF CHEST WITH CONTRAST:

 

Date:  02/20/18 

 

COMPARISON:  

None. 

 

HISTORY:  

Shortness of breath and chest pain. 

 

TECHNIQUE:  

Multiple contiguous axial images were obtained in a CTA of the chest per pulmonary embolism protocol.
 3D oblique MIP reformats and direct coronal reformats were performed. 

 

FINDINGS:

The heart is normal in size without focal cardiac abnormality. The pulmonary arteries are well opacif
ied and without filling defects to suggest pulmonary emboli. No hilar or mediastinal lymphadenopathy 
seen. 

 

No pneumothorax or pleural effusions are seen. No focal infiltrates or lung nodules are seen. 

 

The bones of the thorax and chest wall soft tissues are unremarkable. Please see dedicated abdominal 
CT for findings below the diaphragm. 

 

IMPRESSION: 

No evidence of pulmonary thromboembolism. 

 

 

POS: RIANNA

## 2018-02-24 NOTE — EKG
Test Reason : SYNCOPE

Blood Pressure : ***/*** mmHG

Vent. Rate : 068 BPM     Atrial Rate : 068 BPM

   P-R Int : 090 ms          QRS Dur : 070 ms

    QT Int : 372 ms       P-R-T Axes : 033 024 030 degrees

   QTc Int : 395 ms

 

Sinus rhythm with short OR

Otherwise normal ECG

 

Confirmed by DAHLIA CORTEZ, MARILU (12),  GUI OLIVAREZ ([id number]) on 2/24/2018 5:46:13 PM

 

Referred By:             Confirmed By:MARILU VILLAGOMEZ MD

## 2018-09-16 ENCOUNTER — HOSPITAL ENCOUNTER (EMERGENCY)
Dept: HOSPITAL 92 - ERS | Age: 23
Discharge: HOME | End: 2018-09-16
Payer: COMMERCIAL

## 2018-09-16 DIAGNOSIS — H66.91: ICD-10-CM

## 2018-09-16 DIAGNOSIS — F41.9: ICD-10-CM

## 2018-09-16 DIAGNOSIS — F32.9: ICD-10-CM

## 2018-09-16 DIAGNOSIS — H60.91: Primary | ICD-10-CM

## 2018-09-16 PROCEDURE — 99282 EMERGENCY DEPT VISIT SF MDM: CPT

## 2019-02-25 ENCOUNTER — HOSPITAL ENCOUNTER (OUTPATIENT)
Dept: HOSPITAL 92 - ERS | Age: 24
Setting detail: OBSERVATION
LOS: 1 days | Discharge: HOME | End: 2019-02-26
Attending: FAMILY MEDICINE | Admitting: FAMILY MEDICINE
Payer: COMMERCIAL

## 2019-02-25 DIAGNOSIS — N17.9: ICD-10-CM

## 2019-02-25 DIAGNOSIS — R18.8: ICD-10-CM

## 2019-02-25 DIAGNOSIS — Z88.5: ICD-10-CM

## 2019-02-25 DIAGNOSIS — Z93.2: ICD-10-CM

## 2019-02-25 DIAGNOSIS — N83.01: ICD-10-CM

## 2019-02-25 DIAGNOSIS — K50.90: ICD-10-CM

## 2019-02-25 DIAGNOSIS — D72.829: ICD-10-CM

## 2019-02-25 DIAGNOSIS — Z90.49: ICD-10-CM

## 2019-02-25 DIAGNOSIS — Z88.0: ICD-10-CM

## 2019-02-25 DIAGNOSIS — D75.1: ICD-10-CM

## 2019-02-25 DIAGNOSIS — K52.9: Primary | ICD-10-CM

## 2019-02-25 DIAGNOSIS — Z88.1: ICD-10-CM

## 2019-02-25 LAB
ALBUMIN SERPL BCG-MCNC: 5.1 G/DL (ref 3.5–5)
ALP SERPL-CCNC: 115 U/L (ref 40–150)
ALT SERPL W P-5'-P-CCNC: 32 U/L (ref 8–55)
ANION GAP SERPL CALC-SCNC: 21 MMOL/L (ref 10–20)
AST SERPL-CCNC: 20 U/L (ref 5–34)
BACTERIA UR QL AUTO: (no result) HPF
BILIRUB SERPL-MCNC: 1.1 MG/DL (ref 0.2–1.2)
BUN SERPL-MCNC: 14 MG/DL (ref 7–18.7)
CALCIUM SERPL-MCNC: 10.7 MG/DL (ref 7.8–10.44)
CHLORIDE SERPL-SCNC: 99 MMOL/L (ref 98–107)
CO2 SERPL-SCNC: 19 MMOL/L (ref 22–29)
CREAT CL PREDICTED SERPL C-G-VRATE: 0 ML/MIN (ref 70–130)
CRYSTAL-AUWI FLAG: 0.2 (ref 0–15)
GLOBULIN SER CALC-MCNC: 4.8 G/DL (ref 2.4–3.5)
GLUCOSE SERPL-MCNC: 141 MG/DL (ref 70–105)
HEV IGM SER QL: 0.7 (ref 0–7.99)
HGB BLD-MCNC: 16.8 G/DL (ref 12–16)
HYALINE CASTS #/AREA URNS LPF: (no result) LPF
MCH RBC QN AUTO: 30.6 PG (ref 27–31)
MCV RBC AUTO: 92 FL (ref 78–98)
MDIFF COMPLETE?: YES
PATHC CAST-AUWI FLAG: 5.66 (ref 0–2.49)
PLATELET # BLD AUTO: 467 THOU/UL (ref 130–400)
POTASSIUM SERPL-SCNC: 4.4 MMOL/L (ref 3.5–5.1)
PREGS CONTROL BACKGROUND?: (no result)
PREGS CONTROL BAR APPEAR?: YES
PROT UR STRIP.AUTO-MCNC: 30 MG/DL
RBC # BLD AUTO: 5.48 MILL/UL (ref 4.2–5.4)
RBC UR QL AUTO: (no result) HPF (ref 0–3)
SODIUM SERPL-SCNC: 135 MMOL/L (ref 136–145)
SP GR UR STRIP: 1.01 (ref 1–1.04)
SPERM-AUWI FLAG: 0 (ref 0–9.9)
WBC # BLD AUTO: 14.8 THOU/UL (ref 4.8–10.8)
WBC UR QL AUTO: (no result) HPF (ref 0–3)
YEAST-AUWI FLAG: 0 (ref 0–25)

## 2019-02-25 PROCEDURE — 96375 TX/PRO/DX INJ NEW DRUG ADDON: CPT

## 2019-02-25 PROCEDURE — 81003 URINALYSIS AUTO W/O SCOPE: CPT

## 2019-02-25 PROCEDURE — 74177 CT ABD & PELVIS W/CONTRAST: CPT

## 2019-02-25 PROCEDURE — 85025 COMPLETE CBC W/AUTO DIFF WBC: CPT

## 2019-02-25 PROCEDURE — 80048 BASIC METABOLIC PNL TOTAL CA: CPT

## 2019-02-25 PROCEDURE — 81015 MICROSCOPIC EXAM OF URINE: CPT

## 2019-02-25 PROCEDURE — 36415 COLL VENOUS BLD VENIPUNCTURE: CPT

## 2019-02-25 PROCEDURE — 96361 HYDRATE IV INFUSION ADD-ON: CPT

## 2019-02-25 PROCEDURE — G0378 HOSPITAL OBSERVATION PER HR: HCPCS

## 2019-02-25 PROCEDURE — 84703 CHORIONIC GONADOTROPIN ASSAY: CPT

## 2019-02-25 PROCEDURE — 96376 TX/PRO/DX INJ SAME DRUG ADON: CPT

## 2019-02-25 PROCEDURE — 80053 COMPREHEN METABOLIC PANEL: CPT

## 2019-02-25 PROCEDURE — 83630 LACTOFERRIN FECAL (QUAL): CPT

## 2019-02-25 PROCEDURE — 87045 FECES CULTURE AEROBIC BACT: CPT

## 2019-02-25 PROCEDURE — 87324 CLOSTRIDIUM AG IA: CPT

## 2019-02-25 PROCEDURE — 87899 AGENT NOS ASSAY W/OPTIC: CPT

## 2019-02-25 PROCEDURE — 87449 NOS EACH ORGANISM AG IA: CPT

## 2019-02-25 PROCEDURE — 96365 THER/PROPH/DIAG IV INF INIT: CPT

## 2019-02-25 PROCEDURE — 87804 INFLUENZA ASSAY W/OPTIC: CPT

## 2019-02-25 PROCEDURE — 87046 STOOL CULTR AEROBIC BACT EA: CPT

## 2019-02-25 NOTE — CT
CT ABDOMEN AND PELVIS WITH IV CONTRAST:

 

INDICATIONS:

History of abdominal pain with Crohn disease and ileostomy.  The patient has been having vomiting ove
r the course of several hours with excessive emptying of the ileostomy bag.

 

COMPARISON:

CT abdomen and pelvis dated 02/20/2018.

 

FINDINGS:

The lung bases are clear.

 

There is a slight mosaic perfusion pattern involving the right hepatic lobe, which may be related to 
transient hepatic attenuation difference.  No focal hepatic lesion is otherwise evident.  The anterio
r pancreas, adrenal glands, and spleen appear within normal limits.  The kidneys are normal appearing
.

 

There is a stable right lower quadrant ileostomy.

 

There has been interval revision in the lower pelvis of the bowel.  There appears to have been an int
erval complete colectomy.  Previously, there was a long Waleska's pouch in place.  Multiple surgical
 suture lines are seen within involving bowel within the lower pelvis, likely related to an ileoileos
viktoria with anastomosis of the distal ileum to the level of the rectum.  A small amount of free fluid i
s present within the pelvis.  There is a peripherally enhancing hypodensity seen within the region of
 the left adnexa, which may reflect an involuting cyst, measuring up to 1.7 cm.  There is a suspected
 follicular cyst within the right ovary, measuring 2.2 cm.

 

No enlarged lymph nodes are evident.

 

No definite acute osseous abnormality is evident.

 

IMPRESSION:

1.  Interval revision of the patient's previously seen long Waleska's pouch.  There has been removal
 of the long Waleska's pouch and establishment of an ileorectal anastomosis.  The right lower quadra
nt loop ileostomy does not appear significantly changed.  There is no evidence of upstream bowel dila
tation, suggestive of obstruction.

 

2.  Mild free fluid in the pelvis is nonspecific.  The patient does have an suspected involuting cyst
 involving the left adnexa.  A pelvic ultrasound may be helpful for improved characterization, as a c
yst rupture could produce the above findings.

 

3.  Right ovarian follicular cyst suspected.

 

4.  Slight mosaic perfusion abnormality involving the right hepatic lobe may be related to transient 
hepatic attenuation difference or fatty infiltration of the liver.  No definite suspicious focal hepa
tic abnormality is grossly evident.

 

POS: BH

## 2019-02-25 NOTE — PDOC.FPRHP
- History of Present Illness


Chief Complaint: Abdominal Pain


History of Present Illness: 





24 yo F w/hx of Crohn's s/p total colectomy with diverting ileostomy here with 

complaint of epigastric abdominal pain with vomiting and increased watery stool 

output since the about 1800 on 2/14. Due to feeling lightheaded and she 

presented to the ER where work up found an elevated WBC count and abd CT with 

concern for small amounts of non specific intra abdominal fluid. She was given 

2L of NS and IV zofran. Associated symptoms include chills and back pain. She 

has been around a small child with similar symptoms. At the time of evaluation 

pain, nausea, and ostomy output had improved 





- Allergies/Adverse Reactions


 Allergies











Allergy/AdvReac Type Severity Reaction Status Date / Time


 


ciprofloxacin Allergy Unknown Rash Verified 01/29/18 13:49


 


codeine Allergy   Verified 01/29/18 12:08


 


hydrocodone [From [facility]] Allergy   Verified 02/25/19 09:03


 


Penicillins Allergy   Verified 02/25/19 09:03














- History


PMHx:


 Crohn's Disease 





PSHx: 


Total colectomy with diverting ileostomy 12/18





FHx:


 Non contributory 





Social:


Denies etoh, tobacco, recreational drugs


 








- Review of Systems


General: reports: fever/chills


Eyes: denies: vision changes


ENT: denies: nasal congestion


Respiratory: denies: cough, congestion, shortness of breath


Cardiovascular: denies: chest pain, palpitation


Gastrointestinal: reports: nausea, vomiting, other (Watery stool in ostomy)


Genitourinary: denies: incontinence, dysuria, polyuria


Skin: denies: rashes


Musculoskeletal: denies: pain, tenderness


Neurological: denies: numbness, syncope


Psychological: denies: anxiety, depression





- Vital signs


BP: 105/68  HR: 94 RR: 16 Tmax: 98.8 Pox: 98% on RA  Wt: 60 kg   








- Physical Exam


Constitutional: NAD, awake, alert and oriented


HEENT: normocephalic and atraumatic, PERRLA, EOMI, conjunctiva clear, no 

scleral icterus, grossly normal vision, grossly normal hearing, MMM


Neck: trachea midline


Chest: no-tender to palpation


Heart: RRR, normal S1/S2, no edema


Lungs: CTAB, no respiratory distress


Abdomen: soft, non-tender, bowel sounds present


-Abdomen: 





Ostomy site is clean, w/o erythema or exudate


Musculoskeletal: normal structure, normal tone, ROM grossly normal


Neurological: no focal deficit, CN II-XII intact


Skin: no rash/lesions, good turgor, capillary refill <2 seconds


Heme/Lymphatic: no unusual bruising or bleeding


Psychiatric: normal mood and affect, good judgment and insight





FMR H&P: Results





- Labs


Result Diagrams: 


 02/25/19 04:00





 02/25/19 04:00


Lab results: 


 











WBC  14.8 thou/uL (4.8-10.8)  H  02/25/19  04:00    


 


Hgb  16.8 g/dL (12.0-16.0)  H  02/25/19  04:00    


 


Hct  50.4 % (36.0-47.0)  H  02/25/19  04:00    


 


MCV  92.0 fL (78.0-98.0)   02/25/19  04:00    


 


Plt Count  467 thou/uL (130-400)  H  02/25/19  04:00    


 


Band Neuts % (Manual)  20 % (5-11)  H  02/25/19  04:00    


 


Sodium  135 mmol/L (136-145)  L  02/25/19  04:00    


 


Potassium  4.4 mmol/L (3.5-5.1)   02/25/19  04:00    


 


Chloride  99 mmol/L ()   02/25/19  04:00    


 


Carbon Dioxide  19 mmol/L (22-29)  L  02/25/19  04:00    


 


BUN  14 mg/dL (7.0-18.7)   02/25/19  04:00    


 


Creatinine  1.49 mg/dL (0.6-1.1)  H  02/25/19  04:00    


 


Glucose  141 mg/dL ()  H  02/25/19  04:00    


 


Calcium  10.7 mg/dL (7.8-10.44)  H  02/25/19  04:00    


 


Total Bilirubin  1.1 mg/dL (0.2-1.2)   02/25/19  04:00    


 


AST  20 U/L (5-34)   02/25/19  04:00    


 


ALT  32 U/L (8-55)   02/25/19  04:00    


 


Alkaline Phosphatase  115 U/L ()   02/25/19  04:00    


 


Serum Total Protein  9.9 g/dL (6.0-8.3)  H  02/25/19  04:00    


 


Albumin  5.1 g/dL (3.5-5.0)  H  02/25/19  04:00    


 


Urine Ketones  Trace mg/dL (Negative)  H  02/25/19  04:50    


 


Urine Blood  Negative  (Negative)   02/25/19  04:50    


 


Urine Nitrite  Negative  (Negative)   02/25/19  04:50    


 


Ur Leukocyte Esterase  Moderate  (Negative)  H  02/25/19  04:50    


 


Urine RBC  0-3 HPF (0-3)   02/25/19  04:50    


 


Urine WBC  21-50 HPF (0-3)  H  02/25/19  04:50    


 


Ur Squamous Epith Cells  0-3 HPF (0-3)   02/25/19  04:50    


 


Urine Bacteria  Rare-Few HPF (None Seen)   02/25/19  04:50    














- Radiology Interpretation


  ** CT scan - abdomen


Status: image reviewed by me, report reviewed by me (Mild free fluid in pelvis, 

non specific. B/l ovarial cysts, L appears to be involuting)





FMR H&P: A/P





- Problem List


(1) Gastroenteritis


Current Visit: Yes   Status: Suspected   Code(s): K52.9 - NONINFECTIVE 

GASTROENTERITIS AND COLITIS, UNSPECIFIED   





(2) LIZ (acute kidney injury)


Current Visit: Yes   Status: Acute   Code(s): N17.9 - ACUTE KIDNEY FAILURE, 

UNSPECIFIED   





(3) Leukocytosis


Current Visit: Yes   Status: Acute   Code(s): D72.829 - ELEVATED WHITE BLOOD 

CELL COUNT, UNSPECIFIED   





(4) Polycythemia


Current Visit: Yes   Status: Acute   Code(s): D75.1 - SECONDARY POLYCYTHEMIA   





(5) Thrombocytosis


Current Visit: Yes   Status: Acute   





(6) Crohn's disease in remission


Current Visit: Yes   Status: Chronic   Code(s): K50.90 - CROHN'S DISEASE, 

UNSPECIFIED, WITHOUT COMPLICATIONS   





(7) Pelvic fluid collection


Current Visit: Yes   Status: Acute   Code(s): R18.8 - OTHER ASCITES   





(8) Ovarian cyst


Current Visit: Yes   Status: Acute   Code(s): N83.209 - UNSPECIFIED OVARIAN CYST

, UNSPECIFIED SIDE   





- Plan





1. Gastroenteritis, suspected


- given hx of sick contacts and presenting symptoms gastroenteritis appears to 

be most likely dx. DDx includes pouchitis, SBO, anastomotic leak. 


- Influenza swab is pending. 


- Continue to control n/v with zofran


- IVF


- advance diet as tolerated


- will consider re imaging abdomen if abdominal pain returns, at current time 

there are no signs of acute abdomen


- due to non functional j pouch, likelihood of pouchitis is low.





2. LIZ


- due to volume depletion, treat as above. Repeat labs in am





3. Leukocytosis


- most likely related to volume contraction





4. Polycythemia 


- volume contraction





5. Thrombocytosis 


- dt volume contraction





6. Crohn's


- this does not appear to be an acute exacerbation at this time as her symptoms 

have resolved without steroids.


- Low fiber diet dt colectomy 





7. Free pelvic fluid on CT


- most likely source is ovarian cyst. Will continue to monitor status. 





8. B/l ovarian cyst


- incidental finding





PPx SCD


Diet Fiber restricted


Code Full





Dispo: Monitor symptoms over night and recheck kidney fx in the morning. Likely 

dc tomorrow. 











FMR H&P: Upper Level





- Plan


Date/Time: 02/25/19 1045








I, [], have evaluated this patient and agree with findings/plan as outlined by 

intern resident. Pertinent changes/additions are listed here.








Addendum - Attending





- Attending Attestation


Date/Time: 02/25/19 1313





I personally evaluated the patient and discussed the management with Dr. Doe.


I agree with the History, Examination, Assessment and Plan documented above 

with any addition or exceptions noted below.

## 2019-02-25 NOTE — PDOC.EVN
Addendum - Attending





- Attending Attestation


Date/Time: 02/25/19 8995





I personally evaluated the patient and discussed the management with Dr. Doe. 


I agree with the History, Examination, Assessment and Plan documented in his 

electronic H&P with any addition or exceptions noted below.





Patient with PMHx of UC that is now s/p colectomy and in process of J pouch 

formation and anastomosis here with 14 hours of nausea, vomiting, increased 

ostomy output. Reports that symptoms started immediately last night. Reports 

positive sick contact with similar symptoms over the last few days. She has had 

some back pain, chills, sweats, generalized malaise in addition to N/V x 

multiple episodes. Reports midepigastric pain but denies lower abdominal pain. 

She is s/p IVF and Meropenem in the ER. CT obtained which showed mild free fluid

, ovarian cyst, but did not reveal any GI pathology or issues with her tract. 

Labs are consistent with hemoconcentration and LIZ. She will be admitted to obs 

for suspected gastroenteritis, but consider sepsis, obstruction, pouchitis, 

other intraabdominal pathology. Fluid hydrate, nausea control, await culture 

results. Will consider repeat abx if clinical condition does not improve or 

worsens.

## 2019-02-26 VITALS — DIASTOLIC BLOOD PRESSURE: 66 MMHG | SYSTOLIC BLOOD PRESSURE: 113 MMHG | TEMPERATURE: 99.2 F

## 2019-02-26 LAB
ANION GAP SERPL CALC-SCNC: 15 MMOL/L (ref 10–20)
BASOPHILS # BLD AUTO: 0 THOU/UL (ref 0–0.2)
BASOPHILS NFR BLD AUTO: 0.4 % (ref 0–1)
BUN SERPL-MCNC: 9 MG/DL (ref 7–18.7)
CALCIUM SERPL-MCNC: 9.2 MG/DL (ref 7.8–10.44)
CHLORIDE SERPL-SCNC: 102 MMOL/L (ref 98–107)
CO2 SERPL-SCNC: 20 MMOL/L (ref 22–29)
CREAT CL PREDICTED SERPL C-G-VRATE: 106 ML/MIN (ref 70–130)
EOSINOPHIL # BLD AUTO: 0.4 THOU/UL (ref 0–0.7)
EOSINOPHIL NFR BLD AUTO: 7.8 % (ref 0–10)
GLUCOSE SERPL-MCNC: 102 MG/DL (ref 70–105)
HGB BLD-MCNC: 13.2 G/DL (ref 12–16)
LYMPHOCYTES # BLD: 1.8 THOU/UL (ref 1.2–3.4)
LYMPHOCYTES NFR BLD AUTO: 31.5 % (ref 21–51)
MCH RBC QN AUTO: 30.6 PG (ref 27–31)
MCV RBC AUTO: 93.1 FL (ref 78–98)
MONOCYTES # BLD AUTO: 0.7 THOU/UL (ref 0.11–0.59)
MONOCYTES NFR BLD AUTO: 12.1 % (ref 0–10)
NEUTROPHILS # BLD AUTO: 2.8 THOU/UL (ref 1.4–6.5)
NEUTROPHILS NFR BLD AUTO: 48.3 % (ref 42–75)
PLATELET # BLD AUTO: 410 THOU/UL (ref 130–400)
POTASSIUM SERPL-SCNC: 3.7 MMOL/L (ref 3.5–5.1)
RBC # BLD AUTO: 4.31 MILL/UL (ref 4.2–5.4)
SODIUM SERPL-SCNC: 133 MMOL/L (ref 136–145)
WBC # BLD AUTO: 5.8 THOU/UL (ref 4.8–10.8)

## 2019-02-26 NOTE — PDOC.FM
- Subjective


Subjective: 





Seen at bedside this morning. No new complaints. States that abdominal pain/

cramping has significantly improved. Still having increased watery output from 

ileostomy. No n/v, taking PO liquids. Plans to try food this morning. No acute 

events over night. 





- Objective


MAR Reviewed: Yes


Vital Signs & Weight: 


 Vital Signs (12 hours)











  Temp Pulse Resp BP Pulse Ox


 


 02/26/19 04:00  98.1 F  78  16  100/59 L  98


 


 02/25/19 23:35  98.3 F  90  16  108/61  98








 Weight











Weight                         59 kg














I&O: 


 











 02/25/19 02/26/19 02/27/19





 06:59 06:59 06:59


 


Intake Total  2282 


 


Output Total  2000 


 


Balance  282 











Result Diagrams: 


 02/26/19 06:43





 02/26/19 06:43





Phys Exam





- Physical Examination


Constitutional: NAD


HEENT: moist MMs, sclera anicteric


Neck: full ROM


Respiratory: clear to auscultation bilateral


Cardiovascular: RRR, no significant murmur


Gastrointestinal: soft, non-tender, no distention, positive bowel sounds


ostomy site clean


Musculoskeletal: no edema


Neurological: non-focal, moves all 4 limbs


Psychiatric: normal affect, A&O x 3


Skin: no rash





Dx/Plan


(1) Gastroenteritis


Code(s): K52.9 - NONINFECTIVE GASTROENTERITIS AND COLITIS, UNSPECIFIED   Status

: Suspected   





(2) LIZ (acute kidney injury)


Code(s): N17.9 - ACUTE KIDNEY FAILURE, UNSPECIFIED   Status: Acute   





(3) Leukocytosis


Code(s): D72.829 - ELEVATED WHITE BLOOD CELL COUNT, UNSPECIFIED   Status: Acute

   





(4) Polycythemia


Code(s): D75.1 - SECONDARY POLYCYTHEMIA   Status: Acute   





(5) Thrombocytosis


Status: Acute   





(6) Crohn's disease in remission


Code(s): K50.90 - CROHN'S DISEASE, UNSPECIFIED, WITHOUT COMPLICATIONS   Status: 

Chronic   





(7) Pelvic fluid collection


Code(s): R18.8 - OTHER ASCITES   Status: Acute   





(8) Ovarian cyst


Code(s): N83.209 - UNSPECIFIED OVARIAN CYST, UNSPECIFIED SIDE   Status: Acute   





- Plan


Plan: 





1. Gastroenteritis, suspected


- Improving, would expect n/v to improve before diarrhea


- Influenza swab negative 


- No longer needing Zofran


- d/c fluids, will trial PO only today, advance diet as tolerated


- At this time anastomotic leak seems unlikely, will continue to monitor and 

consider repeat imaging if symptoms worsen





2. LIZ, resolved





3. Leukocytosis, resolved





4. Polycythemia, resolved





5. Thrombocytosis 


- persisting, most likely related to inflammatory reaction





6. Crohn's


- Low fiber diet dt colectomy 





7. Free pelvic fluid on CT


- most likely source is ovarian cyst. Will continue to monitor status. 





8. B/l ovarian cyst


- incidental finding





Dispo: Pending PO trial today, pt will be ready to dc this afternoon 





Addendum - Attending





- Attending Attestation


Date/Time: 02/26/19 5033





I personally evaluated the patient and discussed the management with Dr. Doe


I agree with the History, Examination, Assessment and Plan documented above 

with any addition or exceptions noted below.


Today abdomen benign- soft, nt/nd, +bs. Still with doubled ostomy output but no 

n/v. Tolerated a small amount of breakfast today. Stool studies negative thus 

far. 


Viral GE- continue supportive care


Dehydration with prerenal LIZ- Cr at baseline with IVF.  d/c fluids and ensure 

that able to maintain hydration.


Expect d/c this pm or tomorrow am pending course.

## 2019-02-27 NOTE — DIS
DATE OF ADMISSION:  02/25/2019



DATE OF DISCHARGE:  02/26/2019



DISCHARGE ATTENDING:  Blossom Velasquez M.D.



RESIDENT:  Jovon Doe DO.



CONSULTS:  None.



PROCEDURE:  CT abdomen and pelvis on 02/25/2019. Finding, interval revision of

patient previously long Waleska's pouch and establishment of ileorectal

anastomosis. No evidence of bowel dilatation. Mild free fluid in the pelvis,

nonspecific fluid was found to be in the left adnexa and suspected to be 
associated

with an involuting left ovarian cyst. Right ovarian follicular cyst. 



ADMITTING DIAGNOSES:  

1. Acute kidney injury.

2. Gastroenteritis.



SECONDARY DIAGNOSES:  

1. Leukocytosis.

2. Polycythemia.

3. Crohn's disease in remission.

4. Ovarian cyst.



DISCHARGE MEDICATIONS:  

1. Flagyl 500 mg p.o. t.i.d.

2. Zofran 4 mg p.o. q.6 hours p.r.n. nausea and vomiting.



HOSPITAL SUMMARY:  This is a 23-year-old female with a history of Crohn's 
disease

status post total colectomy with diverting ileostomy in 12/2018. She was 
admitted

for acute kidney injury secondary to a gastroenteritis. Prior to presentation 
to the

emergency room, patient was having significant epigastric pain and cramping with

significantly increased watery output into her ostomy bag. She was also having

significant nausea and vomiting. The patient was given Zofran in the emergency 
room,

which significantly improved her nausea and vomiting; however her ileostomy 
output

continued. Due to recent surgery and finding of nonspecific fluid in her pelvis,

there was concern of a possible anastomotic leak, the patient was given 
meropenem in

the emergency room and covered for gram-negatives. Upon admission, the patient 
was

given intravenous fluid resuscitation and worked up for possible infectious 
etiology

for this gastroenteritis. 



Labs found positive stool lactoferin ans negative Shiga toxin. Preliminary stool

culture finding normal enteric marguerite, C-diff Antigen and Toxin were negative 
and a

negative flu. Over the course of admission, the patient recovered quickly and 
did

well with intravenous fluid resuscitation. On second day of admission, fluids 
were

stopped and the patient was able to take liquids and solids p.o. without 
Zofran. The

patient was monitored on second day of admission until patient felt

comfortable with being able to maintain hydration her own. The patient was 
discharged on Flagyl due to

concern for potential pouchitis and the fact that the stool culture was not yet

finalized. Prior to discharge, the patient was comfortable going home with her

ability to maintain her hydration status. 



DISCHARGE INSTRUCTIONS:  Location: Home. 



Followup: With PCP within a week. 



Diet: Advance as tolerated. Low fiber.







Job ID:  [id number]



HARSH

## 2023-12-19 NOTE — RAD
FRONTAL AND LATERAL IMAGING CHEST:

 

DATE: 1/30/18.

 

COMPARISON: 

4/4/13.

 

HISTORY: 

Fever.

 

FINDINGS: 

There is no pneumothorax, pleural fluid, focal consolidation, or alveolar edema.  Heart and mediastin
al contours are unremarkable as are the osseous structures.

 

IMPRESSION: 

No acute findings.

 

POS: [facility] normal (ped)...